# Patient Record
Sex: FEMALE | Race: BLACK OR AFRICAN AMERICAN | NOT HISPANIC OR LATINO | Employment: FULL TIME | URBAN - METROPOLITAN AREA
[De-identification: names, ages, dates, MRNs, and addresses within clinical notes are randomized per-mention and may not be internally consistent; named-entity substitution may affect disease eponyms.]

---

## 2021-05-27 ENCOUNTER — APPOINTMENT (EMERGENCY)
Dept: RADIOLOGY | Facility: HOSPITAL | Age: 46
End: 2021-05-27
Payer: COMMERCIAL

## 2021-05-27 ENCOUNTER — HOSPITAL ENCOUNTER (EMERGENCY)
Facility: HOSPITAL | Age: 46
Discharge: HOME | End: 2021-05-27
Attending: EMERGENCY MEDICINE | Admitting: EMERGENCY MEDICINE
Payer: COMMERCIAL

## 2021-05-27 ENCOUNTER — APPOINTMENT (EMERGENCY)
Dept: CT IMAGING | Facility: HOSPITAL | Age: 46
End: 2021-05-27
Payer: COMMERCIAL

## 2021-05-27 VITALS
HEIGHT: 62 IN | BODY MASS INDEX: 41.58 KG/M2 | DIASTOLIC BLOOD PRESSURE: 60 MMHG | HEART RATE: 66 BPM | SYSTOLIC BLOOD PRESSURE: 97 MMHG | TEMPERATURE: 98.7 F | WEIGHT: 225.97 LBS | RESPIRATION RATE: 21 BRPM | OXYGEN SATURATION: 100 %

## 2021-05-27 DIAGNOSIS — R07.89 CHEST WALL PAIN: Primary | ICD-10-CM

## 2021-05-27 LAB
ALBUMIN SERPL BCP-MCNC: 3.4 G/DL (ref 3.5–5)
ALP SERPL-CCNC: 62 U/L (ref 46–116)
ALT SERPL W P-5'-P-CCNC: 22 U/L (ref 12–78)
ANION GAP SERPL CALCULATED.3IONS-SCNC: 7 MMOL/L (ref 4–13)
APTT PPP: 34 SECONDS (ref 23–37)
AST SERPL W P-5'-P-CCNC: 12 U/L (ref 5–45)
BASOPHILS # BLD AUTO: 0.08 THOUSANDS/ΜL (ref 0–0.1)
BASOPHILS NFR BLD AUTO: 1 % (ref 0–1)
BILIRUB SERPL-MCNC: 0.27 MG/DL (ref 0.2–1)
BUN SERPL-MCNC: 11 MG/DL (ref 5–25)
CALCIUM ALBUM COR SERPL-MCNC: 9.5 MG/DL (ref 8.3–10.1)
CALCIUM SERPL-MCNC: 9 MG/DL (ref 8.3–10.1)
CHLORIDE SERPL-SCNC: 101 MMOL/L (ref 100–108)
CO2 SERPL-SCNC: 28 MMOL/L (ref 21–32)
CREAT SERPL-MCNC: 0.87 MG/DL (ref 0.6–1.3)
EOSINOPHIL # BLD AUTO: 0.32 THOUSAND/ΜL (ref 0–0.61)
EOSINOPHIL NFR BLD AUTO: 4 % (ref 0–6)
ERYTHROCYTE [DISTWIDTH] IN BLOOD BY AUTOMATED COUNT: 17.4 % (ref 11.6–15.1)
EXT PREG TEST URINE: NEGATIVE
EXT. CONTROL ED NAV: NORMAL
GFR SERPL CREATININE-BSD FRML MDRD: 94 ML/MIN/1.73SQ M
GLUCOSE SERPL-MCNC: 94 MG/DL (ref 65–140)
HCT VFR BLD AUTO: 38 % (ref 34.8–46.1)
HGB BLD-MCNC: 12 G/DL (ref 11.5–15.4)
IMM GRANULOCYTES # BLD AUTO: 0.02 THOUSAND/UL (ref 0–0.2)
IMM GRANULOCYTES NFR BLD AUTO: 0 % (ref 0–2)
INR PPP: 1.85 (ref 0.84–1.19)
LYMPHOCYTES # BLD AUTO: 2.92 THOUSANDS/ΜL (ref 0.6–4.47)
LYMPHOCYTES NFR BLD AUTO: 35 % (ref 14–44)
MCH RBC QN AUTO: 25.8 PG (ref 26.8–34.3)
MCHC RBC AUTO-ENTMCNC: 31.6 G/DL (ref 31.4–37.4)
MCV RBC AUTO: 82 FL (ref 82–98)
MONOCYTES # BLD AUTO: 0.85 THOUSAND/ΜL (ref 0.17–1.22)
MONOCYTES NFR BLD AUTO: 10 % (ref 4–12)
NEUTROPHILS # BLD AUTO: 4.22 THOUSANDS/ΜL (ref 1.85–7.62)
NEUTS SEG NFR BLD AUTO: 50 % (ref 43–75)
NRBC BLD AUTO-RTO: 0 /100 WBCS
PLATELET # BLD AUTO: 301 THOUSANDS/UL (ref 149–390)
PMV BLD AUTO: 10.7 FL (ref 8.9–12.7)
POTASSIUM SERPL-SCNC: 4.1 MMOL/L (ref 3.5–5.3)
PROT SERPL-MCNC: 7.7 G/DL (ref 6.4–8.2)
PROTHROMBIN TIME: 20.4 SECONDS (ref 11.6–14.5)
RBC # BLD AUTO: 4.65 MILLION/UL (ref 3.81–5.12)
SARS-COV-2 RNA RESP QL NAA+PROBE: NEGATIVE
SODIUM SERPL-SCNC: 136 MMOL/L (ref 136–145)
TROPONIN I SERPL-MCNC: <0.02 NG/ML
TROPONIN I SERPL-MCNC: <0.02 NG/ML
WBC # BLD AUTO: 8.41 THOUSAND/UL (ref 4.31–10.16)

## 2021-05-27 PROCEDURE — 99285 EMERGENCY DEPT VISIT HI MDM: CPT | Performed by: EMERGENCY MEDICINE

## 2021-05-27 PROCEDURE — 36415 COLL VENOUS BLD VENIPUNCTURE: CPT

## 2021-05-27 PROCEDURE — 81025 URINE PREGNANCY TEST: CPT

## 2021-05-27 PROCEDURE — 93005 ELECTROCARDIOGRAM TRACING: CPT

## 2021-05-27 PROCEDURE — 85025 COMPLETE CBC W/AUTO DIFF WBC: CPT

## 2021-05-27 PROCEDURE — 85610 PROTHROMBIN TIME: CPT | Performed by: EMERGENCY MEDICINE

## 2021-05-27 PROCEDURE — 99285 EMERGENCY DEPT VISIT HI MDM: CPT

## 2021-05-27 PROCEDURE — 85730 THROMBOPLASTIN TIME PARTIAL: CPT | Performed by: EMERGENCY MEDICINE

## 2021-05-27 PROCEDURE — 71275 CT ANGIOGRAPHY CHEST: CPT

## 2021-05-27 PROCEDURE — 71045 X-RAY EXAM CHEST 1 VIEW: CPT

## 2021-05-27 PROCEDURE — U0005 INFEC AGEN DETEC AMPLI PROBE: HCPCS | Performed by: EMERGENCY MEDICINE

## 2021-05-27 PROCEDURE — U0003 INFECTIOUS AGENT DETECTION BY NUCLEIC ACID (DNA OR RNA); SEVERE ACUTE RESPIRATORY SYNDROME CORONAVIRUS 2 (SARS-COV-2) (CORONAVIRUS DISEASE [COVID-19]), AMPLIFIED PROBE TECHNIQUE, MAKING USE OF HIGH THROUGHPUT TECHNOLOGIES AS DESCRIBED BY CMS-2020-01-R: HCPCS | Performed by: EMERGENCY MEDICINE

## 2021-05-27 PROCEDURE — 96374 THER/PROPH/DIAG INJ IV PUSH: CPT

## 2021-05-27 PROCEDURE — 84484 ASSAY OF TROPONIN QUANT: CPT | Performed by: EMERGENCY MEDICINE

## 2021-05-27 PROCEDURE — 84484 ASSAY OF TROPONIN QUANT: CPT

## 2021-05-27 PROCEDURE — 80053 COMPREHEN METABOLIC PANEL: CPT

## 2021-05-27 RX ORDER — KETOROLAC TROMETHAMINE 30 MG/ML
15 INJECTION, SOLUTION INTRAMUSCULAR; INTRAVENOUS ONCE
Status: COMPLETED | OUTPATIENT
Start: 2021-05-27 | End: 2021-05-27

## 2021-05-27 RX ORDER — NAPROXEN 500 MG/1
500 TABLET ORAL 2 TIMES DAILY WITH MEALS
Qty: 30 TABLET | Refills: 0 | Status: SHIPPED | OUTPATIENT
Start: 2021-05-27

## 2021-05-27 RX ADMIN — KETOROLAC TROMETHAMINE 15 MG: 30 INJECTION, SOLUTION INTRAMUSCULAR at 20:48

## 2021-05-27 RX ADMIN — IOHEXOL 85 ML: 350 INJECTION, SOLUTION INTRAVENOUS at 20:00

## 2021-05-28 LAB
ATRIAL RATE: 67 BPM
ATRIAL RATE: 72 BPM
P AXIS: 51 DEGREES
P AXIS: 78 DEGREES
PR INTERVAL: 138 MS
PR INTERVAL: 154 MS
QRS AXIS: 21 DEGREES
QRS AXIS: 28 DEGREES
QRSD INTERVAL: 84 MS
QRSD INTERVAL: 86 MS
QT INTERVAL: 398 MS
QT INTERVAL: 422 MS
QTC INTERVAL: 435 MS
QTC INTERVAL: 445 MS
T WAVE AXIS: 19 DEGREES
T WAVE AXIS: 30 DEGREES
VENTRICULAR RATE: 67 BPM
VENTRICULAR RATE: 72 BPM

## 2021-05-28 PROCEDURE — 93010 ELECTROCARDIOGRAM REPORT: CPT | Performed by: INTERNAL MEDICINE

## 2022-05-06 ENCOUNTER — APPOINTMENT (EMERGENCY)
Dept: CT IMAGING | Facility: HOSPITAL | Age: 47
End: 2022-05-06
Payer: COMMERCIAL

## 2022-05-06 ENCOUNTER — APPOINTMENT (EMERGENCY)
Dept: RADIOLOGY | Facility: HOSPITAL | Age: 47
End: 2022-05-06
Payer: COMMERCIAL

## 2022-05-06 ENCOUNTER — HOSPITAL ENCOUNTER (EMERGENCY)
Facility: HOSPITAL | Age: 47
Discharge: HOME | End: 2022-05-06
Attending: EMERGENCY MEDICINE | Admitting: EMERGENCY MEDICINE
Payer: COMMERCIAL

## 2022-05-06 VITALS
SYSTOLIC BLOOD PRESSURE: 114 MMHG | RESPIRATION RATE: 16 BRPM | HEART RATE: 72 BPM | OXYGEN SATURATION: 99 % | DIASTOLIC BLOOD PRESSURE: 56 MMHG | TEMPERATURE: 99.2 F

## 2022-05-06 DIAGNOSIS — M94.0 COSTOCHONDRITIS: Primary | ICD-10-CM

## 2022-05-06 LAB
ALBUMIN SERPL BCP-MCNC: 3.3 G/DL (ref 3.5–5)
ALP SERPL-CCNC: 56 U/L (ref 46–116)
ALT SERPL W P-5'-P-CCNC: 17 U/L (ref 12–78)
ANION GAP SERPL CALCULATED.3IONS-SCNC: 7 MMOL/L (ref 4–13)
AST SERPL W P-5'-P-CCNC: 10 U/L (ref 5–45)
BASOPHILS # BLD AUTO: 0.08 THOUSANDS/ΜL (ref 0–0.1)
BASOPHILS NFR BLD AUTO: 1 % (ref 0–1)
BILIRUB SERPL-MCNC: 0.14 MG/DL (ref 0.2–1)
BUN SERPL-MCNC: 11 MG/DL (ref 5–25)
CALCIUM ALBUM COR SERPL-MCNC: 9.5 MG/DL (ref 8.3–10.1)
CALCIUM SERPL-MCNC: 8.9 MG/DL (ref 8.3–10.1)
CARDIAC TROPONIN I PNL SERPL HS: <2 NG/L
CARDIAC TROPONIN I PNL SERPL HS: <2 NG/L
CHLORIDE SERPL-SCNC: 104 MMOL/L (ref 100–108)
CO2 SERPL-SCNC: 25 MMOL/L (ref 21–32)
CREAT SERPL-MCNC: 1 MG/DL (ref 0.6–1.3)
D DIMER PPP FEU-MCNC: 0.33 UG/ML FEU
EOSINOPHIL # BLD AUTO: 0.25 THOUSAND/ΜL (ref 0–0.61)
EOSINOPHIL NFR BLD AUTO: 3 % (ref 0–6)
ERYTHROCYTE [DISTWIDTH] IN BLOOD BY AUTOMATED COUNT: 16.1 % (ref 11.6–15.1)
EXT PREG TEST URINE: NEGATIVE
EXT. CONTROL ED NAV: NORMAL
GFR SERPL CREATININE-BSD FRML MDRD: 68 ML/MIN/1.73SQ M
GLUCOSE SERPL-MCNC: 96 MG/DL (ref 65–140)
HCT VFR BLD AUTO: 34.8 % (ref 34.8–46.1)
HGB BLD-MCNC: 11.4 G/DL (ref 11.5–15.4)
IMM GRANULOCYTES # BLD AUTO: 0.03 THOUSAND/UL (ref 0–0.2)
IMM GRANULOCYTES NFR BLD AUTO: 0 % (ref 0–2)
LYMPHOCYTES # BLD AUTO: 3.17 THOUSANDS/ΜL (ref 0.6–4.47)
LYMPHOCYTES NFR BLD AUTO: 34 % (ref 14–44)
MCH RBC QN AUTO: 26.4 PG (ref 26.8–34.3)
MCHC RBC AUTO-ENTMCNC: 32.8 G/DL (ref 31.4–37.4)
MCV RBC AUTO: 81 FL (ref 82–98)
MONOCYTES # BLD AUTO: 0.92 THOUSAND/ΜL (ref 0.17–1.22)
MONOCYTES NFR BLD AUTO: 10 % (ref 4–12)
NEUTROPHILS # BLD AUTO: 4.82 THOUSANDS/ΜL (ref 1.85–7.62)
NEUTS SEG NFR BLD AUTO: 52 % (ref 43–75)
NRBC BLD AUTO-RTO: 0 /100 WBCS
PLATELET # BLD AUTO: 303 THOUSANDS/UL (ref 149–390)
PMV BLD AUTO: 10.9 FL (ref 8.9–12.7)
POTASSIUM SERPL-SCNC: 4.5 MMOL/L (ref 3.5–5.3)
PROT SERPL-MCNC: 7.3 G/DL (ref 6.4–8.2)
RBC # BLD AUTO: 4.32 MILLION/UL (ref 3.81–5.12)
SODIUM SERPL-SCNC: 136 MMOL/L (ref 136–145)
WBC # BLD AUTO: 9.27 THOUSAND/UL (ref 4.31–10.16)

## 2022-05-06 PROCEDURE — 71275 CT ANGIOGRAPHY CHEST: CPT

## 2022-05-06 PROCEDURE — 84484 ASSAY OF TROPONIN QUANT: CPT | Performed by: EMERGENCY MEDICINE

## 2022-05-06 PROCEDURE — 99285 EMERGENCY DEPT VISIT HI MDM: CPT

## 2022-05-06 PROCEDURE — 85379 FIBRIN DEGRADATION QUANT: CPT | Performed by: PHYSICIAN ASSISTANT

## 2022-05-06 PROCEDURE — 71045 X-RAY EXAM CHEST 1 VIEW: CPT

## 2022-05-06 PROCEDURE — 99285 EMERGENCY DEPT VISIT HI MDM: CPT | Performed by: PHYSICIAN ASSISTANT

## 2022-05-06 PROCEDURE — 81025 URINE PREGNANCY TEST: CPT | Performed by: EMERGENCY MEDICINE

## 2022-05-06 PROCEDURE — 96374 THER/PROPH/DIAG INJ IV PUSH: CPT

## 2022-05-06 PROCEDURE — 36415 COLL VENOUS BLD VENIPUNCTURE: CPT

## 2022-05-06 PROCEDURE — 93005 ELECTROCARDIOGRAM TRACING: CPT

## 2022-05-06 PROCEDURE — 80053 COMPREHEN METABOLIC PANEL: CPT | Performed by: EMERGENCY MEDICINE

## 2022-05-06 PROCEDURE — G1004 CDSM NDSC: HCPCS

## 2022-05-06 PROCEDURE — 85025 COMPLETE CBC W/AUTO DIFF WBC: CPT | Performed by: EMERGENCY MEDICINE

## 2022-05-06 RX ORDER — METHOCARBAMOL 500 MG/1
500 TABLET, FILM COATED ORAL ONCE
Status: COMPLETED | OUTPATIENT
Start: 2022-05-06 | End: 2022-05-06

## 2022-05-06 RX ORDER — KETOROLAC TROMETHAMINE 30 MG/ML
15 INJECTION, SOLUTION INTRAMUSCULAR; INTRAVENOUS ONCE
Status: COMPLETED | OUTPATIENT
Start: 2022-05-06 | End: 2022-05-06

## 2022-05-06 RX ADMIN — METHOCARBAMOL 500 MG: 500 TABLET ORAL at 22:59

## 2022-05-06 RX ADMIN — KETOROLAC TROMETHAMINE 15 MG: 30 INJECTION, SOLUTION INTRAMUSCULAR at 22:59

## 2022-05-06 RX ADMIN — IOHEXOL 100 ML: 350 INJECTION, SOLUTION INTRAVENOUS at 22:01

## 2022-05-07 NOTE — ED PROVIDER NOTES
History  Chief Complaint   Patient presents with    Chest Pain     Patient reports intermittent sharp shooting chest pain x 2 weeks, worse with inhalation and when patient is looking to the right  Patient is a 81-year-old female with a past medical history significant for previous pulmonary embolisms presenting to the emergency department for evaluation of two weeks of right-sided 2 weeks of worsening right-sided chest pain  She states that the chest pain gets worse with inspiration and positional changes  Chest wall is tender to touch  She states that this does feel similar to her previous pulmonary embolism, however she is not having any shortness of breath  She states that it just hurts to take a deep breath  She is denying any fevers, chills, abdominal pain, nausea, vomiting, diarrhea  No other complaints at this time  Prior to Admission Medications   Prescriptions Last Dose Informant Patient Reported? Taking?   naproxen (NAPROSYN) 500 mg tablet   No No   Sig: Take 1 tablet (500 mg total) by mouth 2 (two) times a day with meals As needed for chest wall pain      Facility-Administered Medications: None       Past Medical History:   Diagnosis Date    Pulmonary embolism ([facility] [address] )        No past surgical history on file  No family history on file  I have reviewed and agree with the history as documented  E-Cigarette/Vaping     E-Cigarette/Vaping Substances     Social History     Tobacco Use    Smoking status: Not on file    Smokeless tobacco: Not on file   Substance Use Topics    Alcohol use: Not on file    Drug use: Not on file       Review of Systems   Constitutional: Negative for chills, diaphoresis and fever  HENT: Negative for congestion, facial swelling, nosebleeds, sore throat and voice change  Eyes: Negative for pain and redness  Respiratory: Negative for cough, choking, chest tightness, shortness of breath and stridor  Cardiovascular: Positive for chest pain   Negative for palpitations  Gastrointestinal: Negative for abdominal pain, diarrhea, nausea and vomiting  Musculoskeletal: Negative for arthralgias, back pain, myalgias, neck pain and neck stiffness  Skin: Negative for color change and rash  Neurological: Negative for dizziness, syncope, facial asymmetry, weakness, light-headedness, numbness and headaches  Psychiatric/Behavioral: Negative for confusion and suicidal ideas  All other systems reviewed and are negative  Physical Exam  Physical Exam  Vitals reviewed  Constitutional:       General: She is not in acute distress  Appearance: Normal appearance  She is obese  She is not ill-appearing, toxic-appearing or diaphoretic  HENT:      Head: Normocephalic and atraumatic  Right Ear: External ear normal       Left Ear: External ear normal       Nose: Nose normal  No congestion or rhinorrhea  Mouth/Throat:      Mouth: Mucous membranes are moist       Pharynx: Oropharynx is clear  No oropharyngeal exudate or posterior oropharyngeal erythema  Eyes:      General: No scleral icterus  Right eye: No discharge  Left eye: No discharge  Extraocular Movements: Extraocular movements intact  Conjunctiva/sclera: Conjunctivae normal       Pupils: Pupils are equal, round, and reactive to light  Cardiovascular:      Rate and Rhythm: Normal rate and regular rhythm  Pulses: Normal pulses  Heart sounds: Normal heart sounds  No murmur heard  No friction rub  No gallop  Pulmonary:      Effort: Pulmonary effort is normal  No respiratory distress  Breath sounds: Normal breath sounds  No stridor  No wheezing, rhonchi or rales  Chest:      Chest wall: Tenderness (Right costosternal junction) present  Abdominal:      General: Abdomen is flat  Palpations: Abdomen is soft  Tenderness: There is no abdominal tenderness  There is no guarding or rebound     Musculoskeletal:      Cervical back: Normal range of motion and neck supple  Right lower leg: No edema  Left lower leg: No edema  Skin:     General: Skin is warm and dry  Capillary Refill: Capillary refill takes less than 2 seconds  Neurological:      General: No focal deficit present  Mental Status: She is alert and oriented to person, place, and time  Psychiatric:         Mood and Affect: Mood normal          Behavior: Behavior normal          Vital Signs  ED Triage Vitals   Temperature Pulse Respirations Blood Pressure SpO2   05/06/22 2034 05/06/22 2034 05/06/22 2034 05/06/22 2036 05/06/22 2034   99 2 °F (37 3 °C) 77 18 102/53 100 %      Temp Source Heart Rate Source Patient Position - Orthostatic VS BP Location FiO2 (%)   05/06/22 2034 -- 05/06/22 2255 05/06/22 2255 --   Oral  Sitting Left arm       Pain Score       05/06/22 2255       5           Vitals:    05/06/22 2034 05/06/22 2036 05/06/22 2255   BP:  102/53 114/56   Pulse: 77  72   Patient Position - Orthostatic VS:   Sitting         Visual Acuity      ED Medications  Medications   iohexol (OMNIPAQUE) 350 MG/ML injection (SINGLE-DOSE) 100 mL (100 mL Intravenous Given 5/6/22 2201)   ketorolac (TORADOL) injection 15 mg (15 mg Intravenous Given 5/6/22 2259)   methocarbamol (ROBAXIN) tablet 500 mg (500 mg Oral Given 5/6/22 2259)       Diagnostic Studies  Results Reviewed     Procedure Component Value Units Date/Time    HS Troponin I 2hr [[id number]] Collected: 05/06/22 2304    Lab Status:  In process Specimen: Blood from Arm, Right Updated: 05/06/22 2306    POCT pregnancy, urine [[id number]]  (Normal) Resulted: 05/06/22 2258    Lab Status: Final result Updated: 05/06/22 2258     EXT PREG TEST UR (Ref: Negative) Negative     Control Valid    HS Troponin I 4hr [[id number]]     Lab Status: No result Specimen: Blood     D-dimer, quantitative [[id number]]  (Normal) Collected: 05/06/22 2150    Lab Status: Final result Specimen: Blood from Arm, Right Updated: 05/06/22 2223     D-Dimer, Quant 0 33 ug/ml FEU     HS Troponin 0hr (reflex protocol) [[id number]]  (Normal) Collected: 05/06/22 2047    Lab Status: Final result Specimen: Blood from Arm, Right Updated: 05/06/22 2122     hs TnI 0hr <2 ng/L     CBC and differential [[id number]]  (Abnormal) Collected: 05/06/22 2113    Lab Status: Final result Specimen: Blood from Arm, Right Updated: 05/06/22 2120     WBC 9 27 Thousand/uL      RBC 4 32 Million/uL      Hemoglobin 11 4 g/dL      Hematocrit 34 8 %      MCV 81 fL      MCH 26 4 pg      MCHC 32 8 g/dL      RDW 16 1 %      MPV 10 9 fL      Platelets 734 Thousands/uL      nRBC 0 /100 WBCs      Neutrophils Relative 52 %      Immat GRANS % 0 %      Lymphocytes Relative 34 %      Monocytes Relative 10 %      Eosinophils Relative 3 %      Basophils Relative 1 %      Neutrophils Absolute 4 82 Thousands/µL      Immature Grans Absolute 0 03 Thousand/uL      Lymphocytes Absolute 3 17 Thousands/µL      Monocytes Absolute 0 92 Thousand/µL      Eosinophils Absolute 0 25 Thousand/µL      Basophils Absolute 0 08 Thousands/µL     Comprehensive metabolic panel [[id number]]  (Abnormal) Collected: 05/06/22 2047    Lab Status: Final result Specimen: Blood from Arm, Right Updated: 05/06/22 2113     Sodium 136 mmol/L      Potassium 4 5 mmol/L      Chloride 104 mmol/L      CO2 25 mmol/L      ANION GAP 7 mmol/L      BUN 11 mg/dL      Creatinine 1 00 mg/dL      Glucose 96 mg/dL      Calcium 8 9 mg/dL      Corrected Calcium 9 5 mg/dL      AST 10 U/L      ALT 17 U/L      Alkaline Phosphatase 56 U/L      Total Protein 7 3 g/dL      Albumin 3 3 g/dL      Total Bilirubin 0 14 mg/dL      eGFR 68 ml/min/1 73sq m     Narrative:      [facility] guidelines for Chronic Kidney Disease (CKD):     Stage 1 with normal or high GFR (GFR > 90 mL/min/1 73 square meters)    Stage 2 Mild CKD (GFR = 60-89 mL/min/1 73 square meters)    Stage 3A Moderate CKD (GFR = 45-59 mL/min/1 73 square meters)    Stage 3B Moderate CKD (GFR = 30-44 mL/min/1 73 square meters)    Stage 4 Severe CKD (GFR = 15-29 mL/min/1 73 square meters)    Stage 5 End Stage CKD (GFR <15 mL/min/1 73 square meters)  Note: GFR calculation is accurate only with a steady state creatinine                 CTA ED chest PE Study   Final Result by Elmo Eden DO (05/06 2240)      No evidence of pulmonary artery embolus                  Workstation performed: [id number]         XR chest 1 view portable    (Results Pending)              Procedures  Procedures         ED Course  ED Course as of 05/06/22 2327   Fri May 06, 2022   2233 EKG reveals normal sinus rhythm with a rate of 73 beats per minute  No ST or T-wave changes  Unchanged from previous EKGs  SBIRT 22yo+      Most Recent Value   SBIRT (24 yo +)    In order to provide better care to our patients, we are screening all of our patients for alcohol and drug use  Would it be okay to ask you these screening questions? Yes Filed at: 05/06/2022 2258   Initial Alcohol Screen: US AUDIT-C     1  How often do you have a drink containing alcohol? 0 Filed at: 05/06/2022 2258   2  How many drinks containing alcohol do you have on a typical day you are drinking? 0 Filed at: 05/06/2022 2258   3a  Male UNDER 65: How often do you have five or more drinks on one occasion? 0 Filed at: 05/06/2022 2258   3b  FEMALE Any Age, or MALE 65+: How often do you have 4 or more drinks on one occassion? 0 Filed at: 05/06/2022 2258   Audit-C Score 0 Filed at: 05/06/2022 2258   REJI: How many times in the past year have you    Used an illegal drug or used a prescription medication for non-medical reasons? Never Filed at: 05/06/2022 2258                    MDM  Number of Diagnoses or Management Options  Costochondritis  Diagnosis management comments: Patient presenting for evaluation of chest pain  She appears comfortable  She is not any acute distress  Vital signs unremarkable    Lab work reassuring, EKG nonischemic, PE study normal   Pain reproducible to palpation, likely costochondritis  Patient felt better with Toradol Robaxin  She was discharged home with instructions follow-up with her primary care provider  Strict return precautions discussed  Patient is stable condition at time of discharge  Amount and/or Complexity of Data Reviewed  Clinical lab tests: ordered and reviewed  Tests in the radiology section of CPT®: ordered and reviewed    Patient Progress  Patient progress: stable      Disposition  Final diagnoses:   Costochondritis     Time reflects when diagnosis was documented in both MDM as applicable and the Disposition within this note     Time User Action Codes Description Comment    5/6/2022 11:26 PM Berny Davenport Add [M94 0] Costochondritis       ED Disposition     ED Disposition Condition Date/Time Comment    Discharge Stable Fri May 6, 2022 11:26 PM Jenni Martinez discharge to home/self care  Follow-up Information     Follow up With Specialties Details Why Contact Info Additional [address] [facility] Emergency Department Emergency Medicine Go to  If symptoms worsen [address] [facility] [id number] [facility] Emergency Department, [address] [place] [address], [address] Redmon Dennison, MD Family Medicine Schedule an appointment as soon as possible for a visit  for follow up [address] [place] [address]  [phone]             Patient's Medications   Discharge Prescriptions    No medications on file       No discharge procedures on file      PDMP Review     None          ED Provider  Electronically Signed by           Ankit Willett PA-C  05/06/22 0496

## 2022-05-08 LAB
ATRIAL RATE: 73 BPM
P AXIS: 61 DEGREES
PR INTERVAL: 136 MS
QRS AXIS: 29 DEGREES
QRSD INTERVAL: 84 MS
QT INTERVAL: 390 MS
QTC INTERVAL: 429 MS
T WAVE AXIS: 30 DEGREES
VENTRICULAR RATE: 73 BPM

## 2022-05-08 PROCEDURE — 93010 ELECTROCARDIOGRAM REPORT: CPT | Performed by: INTERNAL MEDICINE

## 2022-11-03 ENCOUNTER — TELEPHONE (OUTPATIENT)
Dept: PERINATAL CARE | Facility: OTHER | Age: 47
End: 2022-11-03

## 2022-11-03 NOTE — TELEPHONE ENCOUNTER
Spoke to patient on 10/31 and called and was unable to lvm x2 for patient to reschedule VV visit for 11/7   [facility] message sent

## 2022-11-07 ENCOUNTER — TELEMEDICINE (OUTPATIENT)
Dept: PERINATAL CARE | Facility: CLINIC | Age: 47
End: 2022-11-07

## 2022-11-07 VITALS — WEIGHT: 240 LBS | BODY MASS INDEX: 44.16 KG/M2 | HEIGHT: 62 IN

## 2022-11-07 DIAGNOSIS — J45.20 MILD INTERMITTENT ASTHMA, UNSPECIFIED WHETHER COMPLICATED: ICD-10-CM

## 2022-11-07 DIAGNOSIS — Z98.891 HISTORY OF 2 CESAREAN SECTIONS: ICD-10-CM

## 2022-11-07 DIAGNOSIS — N97.9 INFERTILITY, FEMALE: ICD-10-CM

## 2022-11-07 DIAGNOSIS — Z86.711 HISTORY OF PULMONARY EMBOLISM: Primary | ICD-10-CM

## 2022-11-07 DIAGNOSIS — Z83.2 FAMILY HISTORY OF SICKLE CELL TRAIT: ICD-10-CM

## 2022-11-07 DIAGNOSIS — Z87.59 HISTORY OF NEONATAL DEATH: ICD-10-CM

## 2022-11-07 DIAGNOSIS — Z31.69 ENCOUNTER FOR PRECONCEPTION CONSULTATION: ICD-10-CM

## 2022-11-07 RX ORDER — DIPHENOXYLATE HYDROCHLORIDE AND ATROPINE SULFATE 2.5; .025 MG/1; MG/1
1 TABLET ORAL DAILY
COMMUNITY

## 2022-11-07 RX ORDER — RIVAROXABAN 20 MG/1
20 TABLET, FILM COATED ORAL
COMMUNITY
Start: 2022-09-20

## 2022-11-07 RX ORDER — UREA 10 %
800 LOTION (ML) TOPICAL DAILY
Qty: 90 TABLET | Refills: 5 | Status: SHIPPED | OUTPATIENT
Start: 2022-11-07

## 2022-11-07 RX ORDER — ALBUTEROL SULFATE 90 UG/1
2 AEROSOL, METERED RESPIRATORY (INHALATION) EVERY 6 HOURS PRN
Qty: 25.5 G | Refills: 3 | Status: SHIPPED | OUTPATIENT
Start: 2022-11-07

## 2022-11-07 RX ORDER — ATORVASTATIN CALCIUM 20 MG/1
20 TABLET, FILM COATED ORAL DAILY
COMMUNITY
Start: 2022-11-06

## 2022-11-07 NOTE — LETTER
November 7, 2022     Nazia Chicas, [address] [address]  [address] [facility]    Patient: Cynthia Bennett   YOB: 1975   Date of Visit: 11/7/2022       Dear Dr Indiana Deluca: Thank you for referring Elissa Lemos to me for evaluation  Below are my notes for this consultation  If you have questions, please do not hesitate to call me  I look forward to following your patient along with you  Sincerely,        Shanta Egan MD        CC: No Recipients  Shanta Egan MD  11/7/2022  3:14 PM  Sign when Signing Visit  PRECONCEPTION CONSULTATION: MATERNAL-FETAL MEDICINE     Virtual Regular Visit    Verification of patient location: Cynthia Bennett is located in the following state in which I hold an active license [place]  The patient was identified by name and date of birth  She was informed that this is a telemedicine visit and that the visit is being conducted through the [employer]  She agrees to proceed     My office door was closed  No one else was in the room  She acknowledged consent and understanding of privacy and security of the video platform  The patient has agreed to participate and understands they can discontinue the visit at any time  Patient is aware this is a billable service  Assessment and Plan: Ms Brandon Patino is a 41y o  year-old [id number] here for preconception counseling  By issue:    Problem List Items Addressed This Visit        Respiratory    Mild intermittent asthma    Relevant Medications    albuterol (ProAir HFA) 90 mcg/act inhaler       Genitourinary    Infertility, female     · In the setting of pregnancy conceived via in-vitro fertilization, regardless of egg donor used or not, I recommend a screening fetal echocardiogram done around 22-24 weeks gestation owing to the slightly increased risk of fetal cardiac abnormalities in pregnancies conceived via in-vitro fertilization    Growth assessment is advised at 32 weeks  Antepartum fetal surveillance is advised weekly beginning at 36 weeks and continuing until delivery  · We discussed that  risks associated with assisted reproductive technology (ART) and ovulation induction which include multifetal gestations, prematurity, low birth weight, small for gestational age,  mortality,  delivery, placenta previa, abruptio placentae, preeclampsia, and birth defects  Although these risks are much higher in multifetal gestations, even singletons achieved with ART and ovulation induction may be at higher risk than singletons from naturally occurring pregnancies  Screening fetal echocardiogram is typically advised for women who conceived with the assistance of in vitro fertilization due to the increased risk of fetal structural cardiac abnormalities with IVF; this typically done at 22-24 weeks  This is done in addition to the standard ultrasounds offered by our office which include nuchal translucency ultrasound around 11-13 weeks followed by detailed anatomic survey with cervical length screening around 19-20 weeks  We additionally advise growth assessment at 28 and 34 weeks as well as weekly  surveillance beginning at 36 weeks and continuing until delivery  · We discussed that with ART and ovulation induction, higher-order multifetal pregnancy may occur  Multifetal pregnancy and its associated outcomes is the greatest risk of ART and ovulation induction and, consequently, every effort should be made to achieve a aguilar gestation  Risks of multifetal gestation increase dramatically with increasing number of fetuses and include: spontaneous  birth and complications of prematurity, low birth weight, cerebral palsy, and infant mortality  Maternal risks including hypertensive disorders, gestational diabetes, and  delivery risk with increasing number of fetuses    Therefore I strongly recommend all efforts be made to minimize the chance of multifetal gestation  These efforts include following professional society guidelines for number of embryos to be transferred during IVF, such as those from the [employer] (ASRM), and continuing to encourage and expand use of single-embryo transfer  We discussed that fetal reduction is an option, albeit a difficult one, should a multifetal gestation be achieved  · Maternal age >= 39 has been, in some literature, described as "extreme" advanced maternal age  In one study (PMID [id number]), within this age group, there were significantly higher rates of gestational diabetes (17%) and hypertensive complications (05 3%) and rates of  delivery at both less than 37 weeks (OR 2 1) and less than 34 weeks gestation (OR 3 5), when compared to women under 39  Rates of  delivery (78 5%), placenta previa (5 6%), postpartum hemorrhage (4%), and adverse  outcome more significantly higher in this age group as well  Other    History of pulmonary embolism - Primary     · Ordered APS testing though it would not change her management since she is on lifelong anticoagulation  · Ordered thrombophilia testing, for FVL and prothrombin only, given that she is on anticoagulation currently  · Given hematology referral given that she needs to transfer care  · Should switch to BID LMWH periconception  · Recommendations for pregnancy and postpartum are above, in bottom-most row  · Would advised LMWH throughout pregnancy along with timed delivery  Cessation of LMWH will depend on chosen delivery route  Relevant Orders    Prothrombin gene mutation    Factor 5 leiden    Anticardiolipin Ab, IgG/M, Qn    Beta-2 glycoprotein antibodies    Lupus anticoagulant    Ambulatory Referral to Hematology / Oncology    Encounter for preconception consultation     · She is on multivitamin already    · Sent folate Rx though we did not get to review  · Reviewed aneuploidy risks for 41year old as below:  ·   · Advised cessation of her atorvastatin in conjunction with PCP  · Aspirin prophylaxis indicated (strong RF: prior preeclampsia; moderate RFs: AMA, BMI>30, IVF, long IPI, race/racism - see recent AJOG article on race as aspirin risk factor)  Relevant Medications    folic acid (FOLVITE) 140 MCG tablet    Other Relevant Orders    Prothrombin gene mutation    Factor 5 leiden    Hemoglobin Electrophoresis    Anticardiolipin Ab, IgG/M, Qn    Beta-2 glycoprotein antibodies    Lupus anticoagulant    Family history of sickle cell trait     · Advised and ordered hemoglobin electrophoresis  Relevant Orders    Hemoglobin Electrophoresis    BMI 40 0-44 9, adult ([address] )     · Advised healthiest possible weight by healthiest means possible  · Encouraged 150 minutes/week aerobic exercise  · Advised limiting added sugar to 50g/day  · Pregnant women with a BMI>30 ideally should aim for maximum weight gain of 11-20 pounds ideally via healthy diet and regular exercise  Maternal BMI above 30 in pregnancy confers increased risks of preeclampsia, GDM, cardiac dysfunction, sleep apnea,  delivery, and VTE, and fetal risks include miscarriage, fetal anomalies (along with reduced detection), and stillbirth, macrosomia and impaired growth  Growth assessment is advised in the third trimester  For women with prepregnancy BMI >= 40, we advise weekly antepartum fetal surveillance beginning at 34 weeks  History of 2  sections     · We discussed her history of prior  section  Assessment of placental location is indicated at the time of anatomy scan to assess for risk of placenta accreta spectrum (PAS), as PAS is a risk of prior   Operative notes should be reviewed to assess for candidacy for TOLAC though given that her second  occurred at term, I suspect she did not have a prior classical hysterotoomy  History of  death     · Sounds to have been related to complications of prematurity though circumstances unclear  Advised obtaining records though given >20 years, these may be unobtainable  · Given preeclampsia in that  delivery, qualifies for aspirin but does so for other reasons  · Would advise starting antepartum fetal surveillance at 32 weeks given iatrogenic  birth due to preeclampsia  Referring physician:   Do Ari Rodriguez [facility]   [address]  [facility],  [address] [facility]    Reason for consultation:   Chief Complaint   Patient presents with   • Consult     Planning pregnancy, likely IVF, with Dr Na Callahan  Dear Dr Na Callahan,    Thank you for referring patient Sheila Hart for preconception Maternal-Fetal Medicine consultation regarding history of pulmonary embolism, AMA, elevated BMI  As you know, Ms Janet Wilson is a 41y o  year-old [id number]    Her history is as follows:    Past Medical History:   Diagnosis Date   • Asthma     history, no symptoms in years, inhaler ("starts with an A") is    • Pulmonary embolism ([address] )     2017, bilateral, put on Xarelto, took with NSAIDs and had adnexal hemorrhage complication; recurred in 2019 now is on lifelong Xarelto, managed by pulmonologist       Past Surgical History:   Procedure Laterality Date   • APPENDECTOMY     •  SECTION      x2 (, and in , with intervening TOLAC)   • COLONOSCOPY      benign polyp removed   • KNEE SURGERY Right    • SHOULDER SURGERY Right        OB History    Para Term  AB Living   8 3 2 1 5 2   SAB IAB Ectopic Multiple Live Births   3 2 0   3      # Outcome Date GA Lbr Jose/2nd Weight Sex Delivery Anes PTL Lv   8 SAB 2019              Birth Comments: D&C   7 Term    4026 g (8 lb 14 oz) M CS-Unspec   JUDITH      Birth Comments: keloid was excised; her son has SCT   6 Term 56    F    JUDITH   5   32w0d 1814 g (4 lb) F    ND      Birth Comments: 8 months gestation (uncertain # wks); preeclampsia, breech; lived 2 months 2 days,  day after G-tube and trach placed  Unclear on some details  4 IAB               Birth Comments: prior to marriage   3 IAB  16w0d             Birth Comments: T21; second trimester   2 SAB            1 SAB                Gynecologic history: Patient's last menstrual period was 2022 (exact date)  Social History     Tobacco Use   • Smoking status: Never Smoker   • Smokeless tobacco: Never Used   Substance Use Topics   • Alcohol use: Yes     Alcohol/week: 5 0 standard drinks     Types: 5 Glasses of wine per week   • Drug use: Never       Family History   Problem Relation Age of Onset   • Asthma Brother    • Asthma Son        Family history per our office screening tool includes 2 prior pregnancies affected by trisomy 24, son with sickle trait, father with hypertension, mother with hypertension in pregnancy; and her mother had a liver transplant due to alcoholism; but is negative for Ashkenazi [employer] ancestry, birth defects, blood clot in legs or lungs, diabetes, early-onset breast ovarian or colon cancer; other chromosome problem, genetic condition or carrier state for cystic fibrosis,  thalassemia, Santos-Sachs, or spinal muscular atrophy; hemophilia or von Willebrand's disease; opiate use or overdose        Current Outpatient Medications:   •  albuterol (ProAir HFA) 90 mcg/act inhaler, Inhale 2 puffs every 6 (six) hours as needed for wheezing, Disp: 25 5 g, Rfl: 3  •  folic acid (FOLVITE) 918 MCG tablet, Take 1 tablet (800 mcg total) by mouth daily, Disp: 90 tablet, Rfl: 5  •  atorvastatin (LIPITOR) 20 mg tablet, Take 20 mg by mouth daily, Disp: , Rfl:   •  multivitamin (THERAGRAN) TABS, Take 1 tablet by mouth daily, Disp: , Rfl:   •  Omega-3-acid Ethyl Esters & D3 1 & 1000 GM & UNIT KIT, Take 2 g by mouth daily, Disp: , Rfl:   •  Xarelto 20 MG tablet, Take 20 mg by mouth daily with dinner, Disp: , Rfl:     No Known Allergies    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)  Spouse/Partner Name: Prabhu Prince; Age 39; occupation: , Asset Protection at [employer]; he is in good health with exception of overweight and gout  HPI    Review of Systems   Constitutional: Negative for chills, fever and unexpected weight change  HENT: Negative for congestion, dental problem, facial swelling and sore throat  Eyes: Negative for visual disturbance  Respiratory: Negative for cough and shortness of breath  Cardiovascular: Negative for chest pain and palpitations  Gastrointestinal: Negative for diarrhea and vomiting  Endocrine: Negative for polydipsia  Genitourinary: Positive for vaginal bleeding  Negative for dysuria  Menstrual cycle started today   Musculoskeletal: Positive for back pain  Negative for joint swelling  Low back pain she attributes to weight-related   Skin: Negative for rash and wound  Allergic/Immunologic: Negative for immunocompromised state  Neurological: Negative for seizures and headaches  Hematological: Does not bruise/bleed easily  Psychiatric/Behavioral: Negative for hallucinations and suicidal ideas  Vitals: Height 5' 2" (1 575 m), weight 109 kg (240 lb), last menstrual period 11/07/2022, not currently breastfeeding  Physical Exam  Constitutional:       General: She is not in acute distress  Appearance: Normal appearance  She is not ill-appearing, toxic-appearing or diaphoretic  HENT:      Head: Normocephalic and atraumatic  Nose: No congestion or rhinorrhea  Eyes:      General: No scleral icterus  Right eye: No discharge  Left eye: No discharge  Extraocular Movements: Extraocular movements intact  Conjunctiva/sclera: Conjunctivae normal    Pulmonary:      Effort: Pulmonary effort is normal  No respiratory distress     Musculoskeletal: Cervical back: Normal range of motion  Skin:     Coloration: Skin is not jaundiced or pale  Findings: No erythema, lesion or rash  Neurological:      General: No focal deficit present  Mental Status: She is alert and oriented to person, place, and time  Psychiatric:         Mood and Affect: Mood normal          Behavior: Behavior normal        -------------------------    Approximately 71 minutes spent face-to-face (via virtual platform) with >50% spent in counseling and coordinating care  At the conclusion of today's encounter, all questions were answered to her satisfaction  We will see her back once pregnant or sooner if any questions arise  Thank you very much for this kind referral and please do not hesitate to contact me with any further questions or concerns  Sincerely,    Jaky Izaguirre  Attending Physician, Terrie      VIRTUAL VISIT DISCLAIMER      Juana Chow verbally agrees to participate in [employer]  Patient is aware that [employer] could be limited without vital signs or the ability to perform a full hands-on physical exam  Juana Chow understands she or the provider may request at any time to terminate the video visit and request the patient to seek care or treatment in person

## 2022-11-07 NOTE — PATIENT INSTRUCTIONS
My office will mail you your lab requisition forms and hematology consult  If you notice errors on your letter please let me know  It was a pleasure to meet you and stay in touch!

## 2022-11-07 NOTE — ASSESSMENT & PLAN NOTE
· Advised healthiest possible weight by healthiest means possible  · Encouraged 150 minutes/week aerobic exercise  · Advised limiting added sugar to 50g/day  · Pregnant women with a BMI>30 ideally should aim for maximum weight gain of 11-20 pounds ideally via healthy diet and regular exercise  Maternal BMI above 30 in pregnancy confers increased risks of preeclampsia, GDM, cardiac dysfunction, sleep apnea,  delivery, and VTE, and fetal risks include miscarriage, fetal anomalies (along with reduced detection), and stillbirth, macrosomia and impaired growth  Growth assessment is advised in the third trimester  For women with prepregnancy BMI >= 40, we advise weekly antepartum fetal surveillance beginning at 34 weeks

## 2022-11-07 NOTE — ASSESSMENT & PLAN NOTE
· We discussed her history of prior  section  Assessment of placental location is indicated at the time of anatomy scan to assess for risk of placenta accreta spectrum (PAS), as PAS is a risk of prior   Operative notes should be reviewed to assess for candidacy for TOLAC though given that her second  occurred at term, I suspect she did not have a prior classical hysterotoomy

## 2022-11-07 NOTE — ASSESSMENT & PLAN NOTE
· She is on multivitamin already  · Sent folate Rx though we did not get to review  · Reviewed aneuploidy risks for 41year old as below:  ·   · Advised cessation of her atorvastatin in conjunction with PCP  · Aspirin prophylaxis indicated (strong RF: prior preeclampsia; moderate RFs: AMA, BMI>30, IVF, long IPI, race/racism - see recent AJOG article on race as aspirin risk factor)

## 2022-11-07 NOTE — PROGRESS NOTES
PRECONCEPTION CONSULTATION: MATERNAL-FETAL MEDICINE     Virtual Regular Visit    Verification of patient location: [place] is located in the following state in which I hold an active license [place]  The patient was identified by name and date of birth  She was informed that this is a telemedicine visit and that the visit is being conducted through the [employer]  She agrees to proceed     My office door was closed  No one else was in the room  She acknowledged consent and understanding of privacy and security of the video platform  The patient has agreed to participate and understands they can discontinue the visit at any time  Patient is aware this is a billable service  Assessment and Plan: Ms Jefferson Pedro is a 41y o  year-old [id number] here for preconception counseling  By issue:    Problem List Items Addressed This Visit        Respiratory    Mild intermittent asthma    Relevant Medications    albuterol (ProAir HFA) 90 mcg/act inhaler       Genitourinary    Infertility, female     · In the setting of pregnancy conceived via in-vitro fertilization, regardless of egg donor used or not, I recommend a screening fetal echocardiogram done around 22-24 weeks gestation owing to the slightly increased risk of fetal cardiac abnormalities in pregnancies conceived via in-vitro fertilization  Growth assessment is advised at 32 weeks  Antepartum fetal surveillance is advised weekly beginning at 36 weeks and continuing until delivery  · We discussed that  risks associated with assisted reproductive technology (ART) and ovulation induction which include multifetal gestations, prematurity, low birth weight, small for gestational age,  mortality,  delivery, placenta previa, abruptio placentae, preeclampsia, and birth defects    Although these risks are much higher in multifetal gestations, even singletons achieved with ART and ovulation induction may be at higher risk than singletons from naturally occurring pregnancies  Screening fetal echocardiogram is typically advised for women who conceived with the assistance of in vitro fertilization due to the increased risk of fetal structural cardiac abnormalities with IVF; this typically done at 22-24 weeks  This is done in addition to the standard ultrasounds offered by our office which include nuchal translucency ultrasound around 11-13 weeks followed by detailed anatomic survey with cervical length screening around 19-20 weeks  We additionally advise growth assessment at 28 and 34 weeks as well as weekly  surveillance beginning at 36 weeks and continuing until delivery  · We discussed that with ART and ovulation induction, higher-order multifetal pregnancy may occur  Multifetal pregnancy and its associated outcomes is the greatest risk of ART and ovulation induction and, consequently, every effort should be made to achieve a aguilar gestation  Risks of multifetal gestation increase dramatically with increasing number of fetuses and include: spontaneous  birth and complications of prematurity, low birth weight, cerebral palsy, and infant mortality  Maternal risks including hypertensive disorders, gestational diabetes, and  delivery risk with increasing number of fetuses  Therefore I strongly recommend all efforts be made to minimize the chance of multifetal gestation  These efforts include following professional society guidelines for number of embryos to be transferred during IVF, such as those from the [employer] ([employer]), and continuing to encourage and expand use of single-embryo transfer  We discussed that fetal reduction is an option, albeit a difficult one, should a multifetal gestation be achieved  · Maternal age >= 39 has been, in some literature, described as "extreme" advanced maternal age   In one study (PMID [id number]), within this age group, there were significantly higher rates of gestational diabetes (17%) and hypertensive complications (61 1%) and rates of  delivery at both less than 37 weeks (OR 2 1) and less than 34 weeks gestation (OR 3 5), when compared to women under 39  Rates of  delivery (78 5%), placenta previa (5 6%), postpartum hemorrhage (4%), and adverse  outcome more significantly higher in this age group as well  Other    History of pulmonary embolism - Primary     · Ordered APS testing though it would not change her management since she is on lifelong anticoagulation  · Ordered thrombophilia testing, for FVL and prothrombin only, given that she is on anticoagulation currently  · Given hematology referral given that she needs to transfer care  · Should switch to BID LMWH periconception  · Recommendations for pregnancy and postpartum are above, in bottom-most row  · Would advised LMWH throughout pregnancy along with timed delivery  Cessation of LMWH will depend on chosen delivery route  Relevant Orders    Prothrombin gene mutation    Factor 5 leiden    Anticardiolipin Ab, IgG/M, Qn    Beta-2 glycoprotein antibodies    Lupus anticoagulant    Ambulatory Referral to Hematology / Oncology    Encounter for preconception consultation     · She is on multivitamin already  · Sent folate Rx though we did not get to review  · Reviewed aneuploidy risks for 41year old as below:  ·   · Advised cessation of her atorvastatin in conjunction with PCP  · Aspirin prophylaxis indicated (strong RF: prior preeclampsia; moderate RFs: AMA, BMI>30, IVF, long IPI, race/racism - see recent AJOG article on race as aspirin risk factor)           Relevant Medications    folic acid (FOLVITE) 010 MCG tablet    Other Relevant Orders    Prothrombin gene mutation    Factor 5 leiden    Hemoglobin Electrophoresis    Anticardiolipin Ab, IgG/M, Qn    Beta-2 glycoprotein antibodies    Lupus anticoagulant    Family history of sickle cell trait · Advised and ordered hemoglobin electrophoresis  Relevant Orders    Hemoglobin Electrophoresis    BMI 40 0-44 9, adult ([facility] [address] )     · Advised healthiest possible weight by healthiest means possible  · Encouraged 150 minutes/week aerobic exercise  · Advised limiting added sugar to 50g/day  · Pregnant women with a BMI>30 ideally should aim for maximum weight gain of 11-20 pounds ideally via healthy diet and regular exercise  Maternal BMI above 30 in pregnancy confers increased risks of preeclampsia, GDM, cardiac dysfunction, sleep apnea,  delivery, and VTE, and fetal risks include miscarriage, fetal anomalies (along with reduced detection), and stillbirth, macrosomia and impaired growth  Growth assessment is advised in the third trimester  For women with prepregnancy BMI >= 40, we advise weekly antepartum fetal surveillance beginning at 34 weeks  History of 2  sections     · We discussed her history of prior  section  Assessment of placental location is indicated at the time of anatomy scan to assess for risk of placenta accreta spectrum (PAS), as PAS is a risk of prior   Operative notes should be reviewed to assess for candidacy for TOLAC though given that her second  occurred at term, I suspect she did not have a prior classical hysterotoomy  History of  death     · Sounds to have been related to complications of prematurity though circumstances unclear  Advised obtaining records though given >20 years, these may be unobtainable  · Given preeclampsia in that  delivery, qualifies for aspirin but does so for other reasons  · Would advise starting antepartum fetal surveillance at 32 weeks given iatrogenic  birth due to preeclampsia               Referring physician:   Do Keo Beard [facility]   [address]  [facility],  [address] [place] [address]    Reason for consultation:   Chief Complaint   Patient presents with   • Consult     Planning pregnancy, likely IVF, with Dr Juan Townsend  Dear Dr Juan Townsend,    Thank you for referring patient David Bell for preconception Maternal-Fetal Medicine consultation regarding history of pulmonary embolism, AMA, elevated BMI  As you know, Ms Spike Blackman is a 41y o  year-old [id number]  Her history is as follows:    Past Medical History:   Diagnosis Date   • Asthma     history, no symptoms in years, inhaler ("starts with an A") is    • Pulmonary embolism ([address] )     2017, bilateral, put on Xarelto, took with NSAIDs and had adnexal hemorrhage complication; recurred in 2019 now is on lifelong Xarelto, managed by pulmonologist       Past Surgical History:   Procedure Laterality Date   • APPENDECTOMY     •  SECTION      x2 (, and in , with intervening TOLAC)   • COLONOSCOPY      benign polyp removed   • KNEE SURGERY Right    • SHOULDER SURGERY Right        OB History    Para Term  AB Living   8 3 2 1 5 2   SAB IAB Ectopic Multiple Live Births   3 2 0   3      # Outcome Date GA Lbr Jose/2nd Weight Sex Delivery Anes PTL Lv   8 SAB 2019              Birth Comments: D&C   7 Term    4026 g (8 lb 14 oz) M CS-Unspec   JUDITH      Birth Comments: keloid was excised; her son has SCT   6 Term 56    F    JUDITH   5   32w0d  1814 g (4 lb) F    ND      Birth Comments: 8 months gestation (uncertain # wks); preeclampsia, breech; lived 2 months 2 days,  day after G-tube and trach placed  Unclear on some details  4 IAB               Birth Comments: prior to marriage   3 IAB  16w0d             Birth Comments: T21; second trimester   2 SAB            1 SAB                Gynecologic history: Patient's last menstrual period was 2022 (exact date)  Social History     Tobacco Use   • Smoking status: Never Smoker   • Smokeless tobacco: Never Used   Substance Use Topics   • Alcohol use:  Yes     Alcohol/week: 5 0 standard drinks     Types: 5 Glasses of wine per week   • Drug use: Never       Family History   Problem Relation Age of Onset   • Asthma Brother    • Asthma Son        Family history per our office screening tool includes 2 prior pregnancies affected by trisomy 24, son with sickle trait, father with hypertension, mother with hypertension in pregnancy; and her mother had a liver transplant due to alcoholism; but is negative for Ashkenazi Jain ancestry, birth defects, blood clot in legs or lungs, diabetes, early-onset breast ovarian or colon cancer; other chromosome problem, genetic condition or carrier state for cystic fibrosis,  thalassemia, Santos-Sachs, or spinal muscular atrophy; hemophilia or von Willebrand's disease; opiate use or overdose  Current Outpatient Medications:   •  albuterol (ProAir HFA) 90 mcg/act inhaler, Inhale 2 puffs every 6 (six) hours as needed for wheezing, Disp: 25 5 g, Rfl: 3  •  folic acid (FOLVITE) 561 MCG tablet, Take 1 tablet (800 mcg total) by mouth daily, Disp: 90 tablet, Rfl: 5  •  atorvastatin (LIPITOR) 20 mg tablet, Take 20 mg by mouth daily, Disp: , Rfl:   •  multivitamin (THERAGRAN) TABS, Take 1 tablet by mouth daily, Disp: , Rfl:   •  Omega-3-acid Ethyl Esters & D3 1 & 1000 GM & UNIT KIT, Take 2 g by mouth daily, Disp: , Rfl:   •  Xarelto 20 MG tablet, Take 20 mg by mouth daily with dinner, Disp: , Rfl:     No Known Allergies    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)  Spouse/Partner Name: Chirag Lewis; Age 39; occupation: , Asset Protection at [employer]; he is in good health with exception of overweight and gout  HPI    Review of Systems   Constitutional: Negative for chills, fever and unexpected weight change  HENT: Negative for congestion, dental problem, facial swelling and sore throat  Eyes: Negative for visual disturbance  Respiratory: Negative for cough and shortness of breath  Cardiovascular: Negative for chest pain and palpitations  Gastrointestinal: Negative for diarrhea and vomiting  Endocrine: Negative for polydipsia  Genitourinary: Positive for vaginal bleeding  Negative for dysuria  Menstrual cycle started today   Musculoskeletal: Positive for back pain  Negative for joint swelling  Low back pain she attributes to weight-related   Skin: Negative for rash and wound  Allergic/Immunologic: Negative for immunocompromised state  Neurological: Negative for seizures and headaches  Hematological: Does not bruise/bleed easily  Psychiatric/Behavioral: Negative for hallucinations and suicidal ideas  Vitals: Height 5' 2" (1 575 m), weight 109 kg (240 lb), last menstrual period 11/07/2022, not currently breastfeeding  Physical Exam  Constitutional:       General: She is not in acute distress  Appearance: Normal appearance  She is not ill-appearing, toxic-appearing or diaphoretic  HENT:      Head: Normocephalic and atraumatic  Nose: No congestion or rhinorrhea  Eyes:      General: No scleral icterus  Right eye: No discharge  Left eye: No discharge  Extraocular Movements: Extraocular movements intact  Conjunctiva/sclera: Conjunctivae normal    Pulmonary:      Effort: Pulmonary effort is normal  No respiratory distress  Musculoskeletal:      Cervical back: Normal range of motion  Skin:     Coloration: Skin is not jaundiced or pale  Findings: No erythema, lesion or rash  Neurological:      General: No focal deficit present  Mental Status: She is alert and oriented to person, place, and time  Psychiatric:         Mood and Affect: Mood normal          Behavior: Behavior normal        -------------------------    Approximately 71 minutes spent face-to-face (via virtual platform) with >50% spent in counseling and coordinating care    At the conclusion of today's encounter, all questions were answered to her satisfaction  We will see her back once pregnant or sooner if any questions arise  Thank you very much for this kind referral and please do not hesitate to contact me with any further questions or concerns  Sincerely,    Ariel Mcarthur  Attending Physician, [place]      VIRTUAL VISIT DISCLAIMER      Usama Chester verbally agrees to participate in [facility]  Patient is aware that [facility] could be limited without vital signs or the ability to perform a full hands-on physical exam  Usama Chester understands she or the provider may request at any time to terminate the video visit and request the patient to seek care or treatment in person

## 2022-11-07 NOTE — ASSESSMENT & PLAN NOTE
· Ordered APS testing though it would not change her management since she is on lifelong anticoagulation  · Ordered thrombophilia testing, for FVL and prothrombin only, given that she is on anticoagulation currently  · Given hematology referral given that she needs to transfer care  · Should switch to BID LMWH periconception  · Recommendations for pregnancy and postpartum are above, in bottom-most row  · Would advised LMWH throughout pregnancy along with timed delivery  Cessation of LMWH will depend on chosen delivery route

## 2022-11-07 NOTE — ASSESSMENT & PLAN NOTE
· Sounds to have been related to complications of prematurity though circumstances unclear  Advised obtaining records though given >20 years, these may be unobtainable  · Given preeclampsia in that  delivery, qualifies for aspirin but does so for other reasons  · Would advise starting antepartum fetal surveillance at 32 weeks given iatrogenic  birth due to preeclampsia

## 2022-11-08 ENCOUNTER — DOCUMENTATION (OUTPATIENT)
Dept: PERINATAL CARE | Facility: CLINIC | Age: 47
End: 2022-11-08

## 2022-11-08 NOTE — PROGRESS NOTES
Called insurance to request auth for Prothrombin gene mutation(94573), Factor V UTGDEN(55962), Anticardiolipin Ab, ERK/J,GD(35362), Beta-2 glycoprotein VPJMHRHNIL(67916) and Lupus Anticoagulant (03488, [id number], 20574 and 08273)  Per Sheridan Hagan [id number] auth # [id number] valid 11/8/22 - 2/6/23 and for [id number] auth # [id number] valid 11/8/22 - 2/6/23 the other 3 tests don't require auth    Call reference # [id number]

## 2022-11-09 ENCOUNTER — TELEPHONE (OUTPATIENT)
Dept: HEMATOLOGY ONCOLOGY | Facility: CLINIC | Age: 47
End: 2022-11-09

## 2022-11-09 NOTE — TELEPHONE ENCOUNTER
Made attempt to schedule a new patient appointment for Hematology/Oncology, no answer, no voicemail to leave a message

## 2023-01-04 ENCOUNTER — APPOINTMENT (EMERGENCY)
Dept: CT IMAGING | Facility: HOSPITAL | Age: 48
End: 2023-01-04

## 2023-01-04 ENCOUNTER — HOSPITAL ENCOUNTER (EMERGENCY)
Facility: HOSPITAL | Age: 48
Discharge: HOME | End: 2023-01-04
Attending: EMERGENCY MEDICINE

## 2023-01-04 VITALS
HEIGHT: 62 IN | RESPIRATION RATE: 14 BRPM | HEART RATE: 74 BPM | OXYGEN SATURATION: 100 % | DIASTOLIC BLOOD PRESSURE: 72 MMHG | BODY MASS INDEX: 43.06 KG/M2 | SYSTOLIC BLOOD PRESSURE: 111 MMHG | TEMPERATURE: 98 F | WEIGHT: 234 LBS

## 2023-01-04 DIAGNOSIS — R10.30 LOWER ABDOMINAL PAIN: Primary | ICD-10-CM

## 2023-01-04 DIAGNOSIS — R11.2 NAUSEA AND VOMITING, UNSPECIFIED VOMITING TYPE: ICD-10-CM

## 2023-01-04 DIAGNOSIS — D25.9 UTERINE FIBROID: ICD-10-CM

## 2023-01-04 LAB
ALBUMIN SERPL BCP-MCNC: 3.6 G/DL (ref 3.5–5)
ALP SERPL-CCNC: 54 U/L (ref 46–116)
ALT SERPL W P-5'-P-CCNC: 23 U/L (ref 12–78)
ANION GAP SERPL CALCULATED.3IONS-SCNC: 11 MMOL/L (ref 4–13)
AST SERPL W P-5'-P-CCNC: 19 U/L (ref 5–45)
BACTERIA UR QL AUTO: ABNORMAL /HPF
BASOPHILS # BLD AUTO: 0.06 THOUSANDS/ÂΜL (ref 0–0.1)
BASOPHILS NFR BLD AUTO: 1 % (ref 0–1)
BILIRUB SERPL-MCNC: 0.16 MG/DL (ref 0.2–1)
BILIRUB UR QL STRIP: NEGATIVE
BUN SERPL-MCNC: 10 MG/DL (ref 5–25)
CALCIUM SERPL-MCNC: 9.2 MG/DL (ref 8.3–10.1)
CHLORIDE SERPL-SCNC: 104 MMOL/L (ref 96–108)
CLARITY UR: CLEAR
CO2 SERPL-SCNC: 24 MMOL/L (ref 21–32)
COLOR UR: ABNORMAL
CREAT SERPL-MCNC: 1.08 MG/DL (ref 0.6–1.3)
EOSINOPHIL # BLD AUTO: 0.07 THOUSAND/ÂΜL (ref 0–0.61)
EOSINOPHIL NFR BLD AUTO: 1 % (ref 0–6)
ERYTHROCYTE [DISTWIDTH] IN BLOOD BY AUTOMATED COUNT: 18.5 % (ref 11.6–15.1)
EXT PREGNANCY TEST URINE: NEGATIVE
EXT. CONTROL: NORMAL
GFR SERPL CREATININE-BSD FRML MDRD: 62 ML/MIN/1.73SQ M
GLUCOSE SERPL-MCNC: 139 MG/DL (ref 65–140)
GLUCOSE UR STRIP-MCNC: NEGATIVE MG/DL
HCT VFR BLD AUTO: 33.1 % (ref 34.8–46.1)
HGB BLD-MCNC: 10.7 G/DL (ref 11.5–15.4)
HGB UR QL STRIP.AUTO: ABNORMAL
IMM GRANULOCYTES # BLD AUTO: 0.04 THOUSAND/UL (ref 0–0.2)
IMM GRANULOCYTES NFR BLD AUTO: 0 % (ref 0–2)
KETONES UR STRIP-MCNC: NEGATIVE MG/DL
LEUKOCYTE ESTERASE UR QL STRIP: NEGATIVE
LIPASE SERPL-CCNC: 128 U/L (ref 73–393)
LYMPHOCYTES # BLD AUTO: 2.01 THOUSANDS/ÂΜL (ref 0.6–4.47)
LYMPHOCYTES NFR BLD AUTO: 18 % (ref 14–44)
MCH RBC QN AUTO: 24.3 PG (ref 26.8–34.3)
MCHC RBC AUTO-ENTMCNC: 32.3 G/DL (ref 31.4–37.4)
MCV RBC AUTO: 75 FL (ref 82–98)
MONOCYTES # BLD AUTO: 0.55 THOUSAND/ÂΜL (ref 0.17–1.22)
MONOCYTES NFR BLD AUTO: 5 % (ref 4–12)
MUCOUS THREADS UR QL AUTO: ABNORMAL
NEUTROPHILS # BLD AUTO: 8.54 THOUSANDS/ÂΜL (ref 1.85–7.62)
NEUTS SEG NFR BLD AUTO: 75 % (ref 43–75)
NITRITE UR QL STRIP: NEGATIVE
NON-SQ EPI CELLS URNS QL MICRO: ABNORMAL /HPF
NRBC BLD AUTO-RTO: 0 /100 WBCS
PH UR STRIP.AUTO: 6.5 [PH]
PLATELET # BLD AUTO: 313 THOUSANDS/UL (ref 149–390)
PMV BLD AUTO: 10.1 FL (ref 8.9–12.7)
POTASSIUM SERPL-SCNC: 4.5 MMOL/L (ref 3.5–5.3)
PROT SERPL-MCNC: 7.8 G/DL (ref 6.4–8.4)
PROT UR STRIP-MCNC: ABNORMAL MG/DL
RBC # BLD AUTO: 4.4 MILLION/UL (ref 3.81–5.12)
RBC #/AREA URNS AUTO: ABNORMAL /HPF
SODIUM SERPL-SCNC: 139 MMOL/L (ref 135–147)
SP GR UR STRIP.AUTO: 1.02 (ref 1–1.03)
UROBILINOGEN UR STRIP-ACNC: <2 MG/DL
WBC # BLD AUTO: 11.27 THOUSAND/UL (ref 4.31–10.16)
WBC #/AREA URNS AUTO: ABNORMAL /HPF

## 2023-01-04 RX ORDER — ONDANSETRON 4 MG/1
4 TABLET, ORALLY DISINTEGRATING ORAL EVERY 6 HOURS PRN
Qty: 20 TABLET | Refills: 0 | Status: SHIPPED | OUTPATIENT
Start: 2023-01-04

## 2023-01-04 RX ORDER — MORPHINE SULFATE 4 MG/ML
4 INJECTION, SOLUTION INTRAMUSCULAR; INTRAVENOUS ONCE
Status: COMPLETED | OUTPATIENT
Start: 2023-01-04 | End: 2023-01-04

## 2023-01-04 RX ORDER — ONDANSETRON 2 MG/ML
4 INJECTION INTRAMUSCULAR; INTRAVENOUS ONCE
Status: COMPLETED | OUTPATIENT
Start: 2023-01-04 | End: 2023-01-04

## 2023-01-04 RX ADMIN — ONDANSETRON 4 MG: 2 INJECTION INTRAMUSCULAR; INTRAVENOUS at 21:38

## 2023-01-04 RX ADMIN — SODIUM CHLORIDE 1000 ML: 0.9 INJECTION, SOLUTION INTRAVENOUS at 21:40

## 2023-01-04 RX ADMIN — MORPHINE SULFATE 4 MG: 4 INJECTION INTRAVENOUS at 21:38

## 2023-01-04 RX ADMIN — IOHEXOL 100 ML: 350 INJECTION, SOLUTION INTRAVENOUS at 21:23

## 2023-01-04 NOTE — Clinical Note
Lore Wagner was seen and treated in our emergency department on 1/4/2023  Diagnosis:     Aimee Cazares  may return to work on return date  She may return on this date: 01/06/2023         If you have any questions or concerns, please don't hesitate to call        Taran Pérez PA-C    ______________________________           _______________          _______________  Hospital Representative                              Date                                Time

## 2023-01-04 NOTE — Clinical Note
Tye Carmine was seen and treated in our emergency department on 1/4/2023  Diagnosis:     Esteban Bob  may return to work on return date  She may return on this date: 01/06/2023         If you have any questions or concerns, please don't hesitate to call        Phuong Cerda PA-C    ______________________________           _______________          _______________  Hospital Representative                              Date                                Time

## 2023-01-05 LAB
ATRIAL RATE: 90 BPM
P AXIS: 58 DEGREES
PR INTERVAL: 136 MS
QRS AXIS: 17 DEGREES
QRSD INTERVAL: 86 MS
QT INTERVAL: 398 MS
QTC INTERVAL: 486 MS
T WAVE AXIS: 10 DEGREES
VENTRICULAR RATE: 90 BPM

## 2023-01-05 NOTE — ED PROVIDER NOTES
History  Chief Complaint   Patient presents with   • Abdominal Pain     Since 1430, sudden onset, lower abdomen w n/v/d  Patient is a 21-year-old female with a past medical history significant for previous PE on Xarelto presenting to the emergency department for evaluation of bilateral lower abdominal pain that started around 230 this afternoon  She states that the pain comes and goes  10 out of 10 in severity  Pain does not radiate  No exacerbating or alleviating factors  Also nausea and vomiting  Previous abdominal surgeries include appendectomy and  section  Denying any fevers, chills, chest pain, difficulty breathing  No other complaints at this time  Prior to Admission Medications   Prescriptions Last Dose Informant Patient Reported? Taking?    Omega-3-acid Ethyl Esters & D3 1 & 1000 GM & UNIT KIT   Yes No   Sig: Take 2 g by mouth daily   Xarelto 20 MG tablet   Yes No   Sig: Take 20 mg by mouth daily with dinner   albuterol (ProAir HFA) 90 mcg/act inhaler   No No   Sig: Inhale 2 puffs every 6 (six) hours as needed for wheezing   atorvastatin (LIPITOR) 20 mg tablet   Yes No   Sig: Take 20 mg by mouth daily   folic acid (FOLVITE) 020 MCG tablet   No No   Sig: Take 1 tablet (800 mcg total) by mouth daily   multivitamin (THERAGRAN) TABS   Yes No   Sig: Take 1 tablet by mouth daily      Facility-Administered Medications: None       Past Medical History:   Diagnosis Date   • Asthma     history, no symptoms in years, inhaler ("starts with an A") is    • Pulmonary embolism ([facility] [address] )     2017, bilateral, put on Xarelto, took with NSAIDs and had adnexal hemorrhage complication; recurred in 2019 now is on lifelong Xarelto, managed by pulmonologist       Past Surgical History:   Procedure Laterality Date   • APPENDECTOMY     •  SECTION      x2 (, and in , with intervening TOLAC)   • COLONOSCOPY      benign polyp removed   • KNEE SURGERY Right    • SHOULDER SURGERY Right Family History   Problem Relation Age of Onset   • Asthma Brother    • Asthma Son      I have reviewed and agree with the history as documented  E-Cigarette/Vaping     E-Cigarette/Vaping Substances     Social History     Tobacco Use   • Smoking status: Never   • Smokeless tobacco: Never   Substance Use Topics   • Alcohol use: Yes     Alcohol/week: 5 0 standard drinks     Types: 5 Glasses of wine per week   • Drug use: Never       Review of Systems   Constitutional: Negative for chills and fever  HENT: Negative for congestion, facial swelling, nosebleeds, sore throat and voice change  Eyes: Negative for pain and redness  Respiratory: Negative for cough, choking, chest tightness, shortness of breath and stridor  Cardiovascular: Negative for chest pain and palpitations  Gastrointestinal: Positive for abdominal pain, nausea and vomiting  Negative for diarrhea  Musculoskeletal: Negative for arthralgias, back pain, myalgias, neck pain and neck stiffness  Skin: Negative for color change and rash  Neurological: Negative for dizziness, syncope, facial asymmetry, weakness, light-headedness, numbness and headaches  Psychiatric/Behavioral: Negative for confusion and suicidal ideas  All other systems reviewed and are negative  Physical Exam  Physical Exam  Vitals reviewed  Constitutional:       General: She is not in acute distress  Appearance: Normal appearance  She is not ill-appearing, toxic-appearing or diaphoretic  HENT:      Head: Normocephalic and atraumatic  Right Ear: External ear normal       Left Ear: External ear normal       Nose: Nose normal  No congestion or rhinorrhea  Mouth/Throat:      Mouth: Mucous membranes are moist       Pharynx: Oropharynx is clear  No oropharyngeal exudate or posterior oropharyngeal erythema  Eyes:      General: No scleral icterus  Right eye: No discharge  Left eye: No discharge        Extraocular Movements: Extraocular movements intact  Conjunctiva/sclera: Conjunctivae normal    Cardiovascular:      Rate and Rhythm: Normal rate and regular rhythm  Pulses: Normal pulses  Heart sounds: Normal heart sounds  No murmur heard  No friction rub  No gallop  Pulmonary:      Effort: Pulmonary effort is normal  No respiratory distress  Breath sounds: Normal breath sounds  No stridor  No wheezing, rhonchi or rales  Abdominal:      General: Abdomen is flat  Palpations: Abdomen is soft  Tenderness: There is abdominal tenderness (lower abdomen)  There is no guarding or rebound  Musculoskeletal:      Cervical back: Normal range of motion and neck supple  Right lower leg: No edema  Left lower leg: No edema  Skin:     General: Skin is warm and dry  Capillary Refill: Capillary refill takes less than 2 seconds  Neurological:      General: No focal deficit present  Mental Status: She is alert and oriented to person, place, and time     Psychiatric:         Mood and Affect: Mood normal          Behavior: Behavior normal          Vital Signs  ED Triage Vitals   Temperature Pulse Respirations Blood Pressure SpO2   01/04/23 1806 01/04/23 1806 01/04/23 1806 01/04/23 1806 01/04/23 1806   98 °F (36 7 °C) 96 18 100/59 100 %      Temp Source Heart Rate Source Patient Position - Orthostatic VS BP Location FiO2 (%)   01/04/23 1806 01/04/23 1806 01/04/23 1806 01/04/23 1806 --   Tympanic Monitor Sitting Left arm       Pain Score       01/04/23 2138       7           Vitals:    01/04/23 1806 01/04/23 2147 01/04/23 2242   BP: 100/59 108/65 111/72   Pulse: 96 89 74   Patient Position - Orthostatic VS: Sitting Sitting Sitting         Visual Acuity      ED Medications  Medications   sodium chloride 0 9 % bolus 1,000 mL (0 mL Intravenous Stopped 1/4/23 2243)   ondansetron (ZOFRAN) injection 4 mg (4 mg Intravenous Given 1/4/23 2138)   morphine injection 4 mg (4 mg Intravenous Given 1/4/23 2138)   iohexol (OMNIPAQUE) 350 MG/ML injection (SINGLE-DOSE) 100 mL (100 mL Intravenous Given 1/4/23 2123)       Diagnostic Studies  Results Reviewed     Procedure Component Value Units Date/Time    POCT pregnancy, urine [[id number]]  (Normal) Resulted: 01/04/23 2058    Lab Status: Final result Updated: 01/04/23 2058     EXT Preg Test, Ur Negative     Control Valid    Urine Microscopic [[id number]]  (Abnormal) Collected: 01/04/23 2019    Lab Status: Final result Specimen: Urine, Clean Catch Updated: 01/04/23 2028     RBC, UA Innumerable /hpf      WBC, UA 1-2 /hpf      Epithelial Cells Occasional /hpf      Bacteria, UA None Seen /hpf      MUCUS THREADS Occasional    UA w Reflex to Microscopic w Reflex to Culture [[id number]]  (Abnormal) Collected: 01/04/23 2019    Lab Status: Final result Specimen: Urine, Clean Catch Updated: 01/04/23 2027     Color, UA Light Yellow     Clarity, UA Clear     Specific Gravity, UA 1 024     pH, UA 6 5     Leukocytes, UA Negative     Nitrite, UA Negative     Protein, UA Trace mg/dl      Glucose, UA Negative mg/dl      Ketones, UA Negative mg/dl      Urobilinogen, UA <2 0 mg/dl      Bilirubin, UA Negative     Occult Blood, UA Large    Comprehensive metabolic panel [[id number]]  (Abnormal) Collected: 01/04/23 1813    Lab Status: Final result Specimen: Blood from Arm, Right Updated: 01/04/23 1835     Sodium 139 mmol/L      Potassium 4 5 mmol/L      Chloride 104 mmol/L      CO2 24 mmol/L      ANION GAP 11 mmol/L      BUN 10 mg/dL      Creatinine 1 08 mg/dL      Glucose 139 mg/dL      Calcium 9 2 mg/dL      AST 19 U/L      ALT 23 U/L      Alkaline Phosphatase 54 U/L      Total Protein 7 8 g/dL      Albumin 3 6 g/dL      Total Bilirubin 0 16 mg/dL      eGFR 62 ml/min/1 73sq m     Narrative:      [place] guidelines for Chronic Kidney Disease (CKD):   •  Stage 1 with normal or high GFR (GFR > 90 mL/min/1 73 square meters)  •  Stage 2 Mild CKD (GFR = 60-89 mL/min/1 73 square meters)  • Stage 3A Moderate CKD (GFR = 45-59 mL/min/1 73 square meters)  •  Stage 3B Moderate CKD (GFR = 30-44 mL/min/1 73 square meters)  •  Stage 4 Severe CKD (GFR = 15-29 mL/min/1 73 square meters)  •  Stage 5 End Stage CKD (GFR <15 mL/min/1 73 square meters)  Note: GFR calculation is accurate only with a steady state creatinine    Lipase [[id number]]  (Normal) Collected: 01/04/23 1813    Lab Status: Final result Specimen: Blood from Arm, Right Updated: 01/04/23 1835     Lipase 128 u/L     CBC and differential [[id number]]  (Abnormal) Collected: 01/04/23 1813    Lab Status: Final result Specimen: Blood from Arm, Right Updated: 01/04/23 1818     WBC 11 27 Thousand/uL      RBC 4 40 Million/uL      Hemoglobin 10 7 g/dL      Hematocrit 33 1 %      MCV 75 fL      MCH 24 3 pg      MCHC 32 3 g/dL      RDW 18 5 %      MPV 10 1 fL      Platelets 697 Thousands/uL      nRBC 0 /100 WBCs      Neutrophils Relative 75 %      Immat GRANS % 0 %      Lymphocytes Relative 18 %      Monocytes Relative 5 %      Eosinophils Relative 1 %      Basophils Relative 1 %      Neutrophils Absolute 8 54 Thousands/µL      Immature Grans Absolute 0 04 Thousand/uL      Lymphocytes Absolute 2 01 Thousands/µL      Monocytes Absolute 0 55 Thousand/µL      Eosinophils Absolute 0 07 Thousand/µL      Basophils Absolute 0 06 Thousands/µL                  CT abdomen pelvis with contrast   Final Result by Shiela Segundo MD (01/04 2205)      No acute inflammatory process in the abdomen or the pelvis  Workstation performed: [id number]                    Procedures  Procedures         ED Course                                             Medical Decision Making  Patient presenting for evaluation of lower abdominal pain  Upon arrival, she appears comfortable  She is not in any acute distress  Vital signs are unremarkable  She does have lower abdominal tenderness on exam without rebound or guarding  Pain and nausea controlled with morphine and Zofran  Labs unremarkable  Patient currently on her menstrual cycle  CT of the abdomen and pelvis did not reveal any acute pathology, however she does have likely uterine fibroid  Unclear etiology of patient's symptoms at this time, possible dysmenorrhea/uterine fibroid  She was discharged home with instructions to follow-up with OB/GYN  Strict return precautions were discussed  She is in stable condition at time of discharge    Lower abdominal pain: acute illness or injury  Nausea and vomiting, unspecified vomiting type: acute illness or injury  Uterine fibroid: acute illness or injury  Amount and/or Complexity of Data Reviewed  Labs: ordered  Radiology: ordered  Risk  Prescription drug management  Disposition  Final diagnoses:   Lower abdominal pain   Uterine fibroid   Nausea and vomiting, unspecified vomiting type     Time reflects when diagnosis was documented in both MDM as applicable and the Disposition within this note     Time User Action Codes Description Comment    1/4/2023 10:34 PM Eliecer Strickland Add [R10 30] Lower abdominal pain     1/4/2023 10:34 PM Eliecer Strickland Add [D25 9] Uterine fibroid     1/4/2023 10:34 PM Eliecer Strickland Add [R11 2] Nausea and vomiting, unspecified vomiting type       ED Disposition     ED Disposition   Discharge    Condition   Stable    Date/Time   Wed Jan 4, 2023 10:33 PM    Comment   Nanette Marte discharge to home/self care                 Follow-up Information     Follow up With Specialties Details Why Contact Info Additional [address] [facility] Emergency Department Emergency Medicine Go to  If symptoms worsen [address] [facility] [id number] [facility] Emergency Department, [address], [place], [address] [place], [place] Family Medicine Schedule an appointment as soon as possible for a visit  For follow up [address] [facility] [address] Obstetrics and Gynecology Schedule an appointment as soon as possible for a visit  For follow up [address] [phone] [address] [facility], C/JAS [place], [address] [place] [address]   [phone]          Discharge Medication List as of 1/4/2023 10:36 PM      START taking these medications    Details   ondansetron (ZOFRAN-ODT) 4 mg disintegrating tablet Take 1 tablet (4 mg total) by mouth every 6 (six) hours as needed for nausea or vomiting, Starting Wed 1/4/2023, Print         CONTINUE these medications which have NOT CHANGED    Details   albuterol (ProAir HFA) 90 mcg/act inhaler Inhale 2 puffs every 6 (six) hours as needed for wheezing, Starting Mon 11/7/2022, Normal      atorvastatin (LIPITOR) 20 mg tablet Take 20 mg by mouth daily, Starting Sun 11/6/2022, Historical Med      folic acid (FOLVITE) 905 MCG tablet Take 1 tablet (800 mcg total) by mouth daily, Starting Mon 11/7/2022, Normal      multivitamin (THERAGRAN) TABS Take 1 tablet by mouth daily, Historical Med      Omega-3-acid Ethyl Esters & D3 1 & 1000 GM & UNIT KIT Take 2 g by mouth daily, Historical Med      Xarelto 20 MG tablet Take 20 mg by mouth daily with dinner, Starting Tue 9/20/2022, Historical Med             No discharge procedures on file      PDMP Review     None          ED Provider  Electronically Signed by           Aracelis Lewis PA-C  01/06/23 2087

## 2023-01-10 NOTE — PROGRESS NOTES
HEMATOLOGY CLINIC NOTE    Primary Care Provider: Joel Lewis MD  Referring Provider:    MRN: [id number]  : 1975    Assessment / Plan:   1  History of pulmonary embolism  This is a 44-year-old female with a history of recurrent PE, frequent miscarriage who presents for discussion of the preceding  She had hypercoagulable work-up 2022 which showed negative testing specifically for antiphospholipid syndrome  She is taking Xarelto 20 mg once daily without any issues  We will test her for prothrombin gene mutation  Otherwise, she had full panel testing which was negative  Due to her recurrent PE history, occupation with working from home, she will continue lifelong AC with Xarelto  Could consider dropping to prophylactic dosage in the future  - Prothrombin gene mutation; Future    2  Breast cancer screening by mammogram  Due for mammogram  Will order for her to have done shortly  No breast complaints  - Mammo screening bilateral w cad; Future    3  Microcytic hypochromic anemia  4  Iron deficiency anemia due to chronic blood loss  5  Menorrhagia with regular cycle  Patient recently was diagnosed with fibroids  She has severe menorrhagia  She is seeing OB/GYN shortly  She has a referral and will make an appointment soon  Her most recent labs showed hemoglobin 10 7, MCV 75  No recent iron panel  I discussed IV iron treatments with her providing option of Venofor 300 mg x 4 versus Feraheme 510 mg x 2 versus Injectafer 750 mg x 2  She would like to proceed with Feraheme 510 mg x 2  If insurance does not want to cover this, we will consider Venofer instead  She will have iron panel prior to starting IV iron treatment  She will have CBC, iron panel in 3 months to see response  She understands providing IV iron as a temporary solution    She needs to see OB/GYN to help solve underlying etiology which is her fibroids       - Iron Panel (Includes Ferritin, Iron Sat%, Iron, and TIBC); Future  - CBC and differential; Future    Patient education provided regarding IV iron  Side effects include but are not limited to the following: hypotension, muscle aches, headache, chills, diarrhea, nausea, lightheadedness, rashes, and very rarely anaphylactic reaction  Patient understands & would still like to proceed with treatment  · Discussion of decision making    I personally reviewed the following lab results, the image studies, pathology, other specialty/physicians consult notes and recommendations, and outside medical records from [facility]  I had a lengthy discussion with the patient and shared the work-up findings  I spent 50 minutes reviewing the records (labs, clinician notes, outside records, medical history, ordering medicine/tests/procedures, interpreting the imaging/labs previously done) and coordination of care as well as direct time with the patient today, of which greater than 50% of the time was spent in counseling and coordination of care with the patient/family  · Plan/Labs  · Continue lifelong anticoagulation  Patient does not meet criteria for antiphospholipid syndrome  Continue Xarelto 20 mg once daily  May consider prophylactic dose in the future  Prothrombin gene mutation testing to be done shortly  · Provide IV iron with Feraheme 510 mg x 2  Obtain iron panel prior to treatment  Repeat CBC, iron panel in 3 months  · Order mammogram for breast cancer screening  Follow Up: 3 months virtually     All questions were answered to the patient's satisfaction during this encounter  The patient knows the contact information for our office and knows to reach out for any relevant concerns related to this encounter   They are to call for any temperature 100 4 or higher, new symptoms including but not restricted to shaking chills, decreased appetite, nausea, vomiting, diarrhea, increased fatigue, shortness of breath or chest pain, confusion, and not feeling the strength to come to the clinic  For all other listed problems and medical diagnosis in their chart - they are managed by PCP and/or other specialists, which the patient acknowledges  Thank you very much for your consultation and making us a part of this patient's care  We are continuing to follow closely with you  Please do not hesitate to reach out to me with any additional questions or concerns  Reason for visit:     No chief complaint on file  History of Hematology Illness:     Coni Gibson is a 41 y o  female who came in for consultation  1  Pulmonary Embolism, bilateral  2  History of multiple miscarriages   - 2017: Patient had bilateral PE with possible heart strain  Diagnosed in [place]  Was placed on Xarelto 20mg once daily for 7 months  Then taken off due to menstrual cycle increased  - 2020/2021: Patient had bilateral PE again  Diagnosed in [place]  Placed on Xarelto again   - Patient had history of 4 miscarriages  All in first trimester  Last pregnancy was in 2020/2021   - Possible provoking factor for 2017 is long travel in car sometimes for entire days on weekend with   Patient currently working from home sitting for long periods of time  Currently on lifelong AC due to recurrent PE + occupational history    - 11/2022: Patient was tested with factor V Leiden mutation, lupus anticoagulant, protein S total antigen, protein S functional testing, protein C functional testing, factor II, beta-2 glycoprotein antibodies, Antithrombin III activity, anticardiolipin antibody all of which were unrevealing  Only testing not done was prothrombin gene mutation  3  Microcytic Hypochromic Anemia likely secondary to Menorrhagia  - patient recently diagnosed with fibroids  - 1/4/23: WBC 11 27, Hgb 10 7, MCV 75, PLT 3131K, ANC 8 54, rest of diff acceptable  No iron panel  Interval History:   01/10/23:  This is a 41year old female with a PMH of asthma, infertility, PE, FHx sickle cell trait, overweight presenting for consultation  Patient believes she may have seen hematology in 2017  She is currently taking Xarelto 20 mg once daily without significant problem  She recently was diagnosed with a fibroid and is having very heavy menstrual cycle  Otherwise, no other bleeding  She is on lifelong anticoagulation  No current chest pain, shortness of breath  Menstrual cycle lasts about 5 to 6 days with the first 3 heaviest   She uses 3 pads at a time on heaviest days and needs to change this every 2 hours  She does have fatigue,  especially surrounding menorrhagia  She has pica for ice  Slight lightheadedness, dizziness  Does not have energy to perform some ADLs  Patient has no other VTE other than above  patient has no family history of VTE  She does have a son with sickle cell trait  She has no liver or kidney conditions  Prior to VTE, no trauma, severe immobilization, surgery, hospitalization, birth control use  She did have some travel prior to 2017 PE as above  She does not smoke  No CVA or MI history  Miscarriage history as above  No stillborn birth or this carriages after  first trimester  No autoimmune conditions she knows of  She drinks occasionally  Not daily  She works from home for a [employer]  She has never had cancer personally  No family history of cancer  She is due for mammogram as last one was 2022  Problem list:     Patient Active Problem List   Diagnosis   • History of pulmonary embolism   • Encounter for preconception consultation   • Family history of sickle cell trait   • Mild intermittent asthma   • BMI 40 0-44 9, adult ([facility] [address] )   • Infertility, female   • History of 2  sections   • History of  death       REVIEW OF SYMPTOMS:   Review of Systems   Constitutional: Positive for fatigue  Negative for activity change, appetite change and unexpected weight change  HENT: Negative for nosebleeds  Eyes: Negative for visual disturbance  Respiratory: Negative for cough and shortness of breath  Cardiovascular: Negative for chest pain, palpitations and leg swelling  Gastrointestinal: Negative for abdominal pain, anal bleeding, blood in stool, constipation, diarrhea, nausea and vomiting  PICA for ice    Endocrine: Negative for cold intolerance  Genitourinary: Positive for menstrual problem  Negative for hematuria and vaginal bleeding  Musculoskeletal: Negative for arthralgias  Skin: Negative for color change, pallor and rash  Neurological: Positive for dizziness and light-headedness  Negative for syncope and headaches  Hematological: Negative for adenopathy  Does not bruise/bleed easily  Psychiatric/Behavioral: Negative for sleep disturbance  PHYSICAL EXAMINATION:     Vital Signs:   LMP 01/03/2023 (Exact Date)   There is no height or weight on file to calculate BSA  Ht Readings from Last 8 Encounters:   01/04/23 5' 2" (1 575 m)   11/07/22 5' 2" (1 575 m)   05/27/21 5' 2" (1 575 m)       Wt Readings from Last 8 Encounters:   01/04/23 106 kg (234 lb)   11/07/22 109 kg (240 lb)   05/27/21 102 kg (225 lb 15 5 oz)          Physical Exam  Constitutional:       General: She is not in acute distress  Appearance: Normal appearance  She is not ill-appearing, toxic-appearing or diaphoretic  HENT:      Head: Normocephalic and atraumatic  Eyes:      General: No scleral icterus  Extraocular Movements: Extraocular movements intact  Conjunctiva/sclera: Conjunctivae normal       Pupils: Pupils are equal, round, and reactive to light  Cardiovascular:      Rate and Rhythm: Normal rate and regular rhythm  Pulmonary:      Effort: Pulmonary effort is normal  No respiratory distress  Abdominal:      Tenderness: There is no abdominal tenderness  Musculoskeletal:         General: No tenderness  Normal range of motion  Cervical back: Normal range of motion and neck supple  Right lower leg: No edema  Left lower leg: No edema  Skin:     General: Skin is warm and dry  Coloration: Skin is not jaundiced or pale  Findings: No bruising, erythema, lesion or rash  Neurological:      General: No focal deficit present  Mental Status: She is alert and oriented to person, place, and time  Mental status is at baseline  Motor: No weakness  Psychiatric:         Mood and Affect: Mood normal          Behavior: Behavior normal          Thought Content: Thought content normal          Judgment: Judgment normal            Reviewed historical information        PAST MEDICAL HISTORY:  Past Medical History:   Diagnosis Date   • Asthma     history, no symptoms in years, inhaler ("starts with an A") is    • Pulmonary embolism ([facility] [address] )     2017, bilateral, put on Xarelto, took with NSAIDs and had adnexal hemorrhage complication; recurred in 2019 now is on lifelong Xarelto, managed by pulmonologist       PAST SURGICAL HISTORY:  Past Surgical History:   Procedure Laterality Date   • APPENDECTOMY     •  SECTION      x2 (, and in , with intervening TOLAC)   • COLONOSCOPY      benign polyp removed   • KNEE SURGERY Right    • SHOULDER SURGERY Right          CURRENT MEDICATIONS:     Current Outpatient Medications:   •  albuterol (ProAir HFA) 90 mcg/act inhaler, Inhale 2 puffs every 6 (six) hours as needed for wheezing, Disp: 25 5 g, Rfl: 3  •  atorvastatin (LIPITOR) 20 mg tablet, Take 20 mg by mouth daily, Disp: , Rfl:   •  folic acid (FOLVITE) 228 MCG tablet, Take 1 tablet (800 mcg total) by mouth daily, Disp: 90 tablet, Rfl: 5  •  multivitamin (THERAGRAN) TABS, Take 1 tablet by mouth daily, Disp: , Rfl:   •  Omega-3-acid Ethyl Esters & D3 1 & 1000 GM & UNIT KIT, Take 2 g by mouth daily, Disp: , Rfl:   •  ondansetron (ZOFRAN-ODT) 4 mg disintegrating tablet, Take 1 tablet (4 mg total) by mouth every 6 (six) hours as needed for nausea or vomiting, Disp: 20 tablet, Rfl: 0  •  Xarelto 20 MG tablet, Take 20 mg by mouth daily with dinner, Disp: , Rfl:     SOCIAL HISTORY:  Social History     Tobacco Use   • Smoking status: Never   • Smokeless tobacco: Never   Substance Use Topics   • Alcohol use: Yes     Alcohol/week: 5 0 standard drinks     Types: 5 Glasses of wine per week   • Drug use: Never       FAMILY HISTORY:  Family History   Problem Relation Age of Onset   • Asthma Brother    • Asthma Son        ALLERGIES:  No Known Allergies      LAB:  Lab Results   Component Value Date    WBC 11 27 (H) 01/04/2023    HGB 10 7 (L) 01/04/2023    HCT 33 1 (L) 01/04/2023    MCV 75 (L) 01/04/2023     01/04/2023     Lab Results   Component Value Date    SODIUM 139 01/04/2023    K 4 5 01/04/2023     01/04/2023    CO2 24 01/04/2023    AGAP 11 01/04/2023    BUN 10 01/04/2023    CREATININE 1 08 01/04/2023    GLUC 139 01/04/2023    CALCIUM 9 2 01/04/2023    AST 19 01/04/2023    ALT 23 01/04/2023    ALKPHOS 54 01/04/2023    TP 7 8 01/04/2023    TBILI 0 16 (L) 01/04/2023    EGFR 62 01/04/2023       IMAGING:  CT abdomen pelvis with contrast  Narrative: CT ABDOMEN AND PELVIS WITH IV CONTRAST    INDICATION:   LLQ abdominal pain  lower abdominal pain  COMPARISON:  None  TECHNIQUE:  CT examination of the abdomen and pelvis was performed  Axial, sagittal, and coronal 2D reformatted images were created from the source data and submitted for interpretation  Radiation dose length product (DLP) for this visit:  836 mGy-cm   This examination, like all CT scans performed in the [facility], was performed utilizing techniques to minimize radiation dose exposure, including the use of iterative   reconstruction and automated exposure control  IV Contrast:  100 mL of iohexol (OMNIPAQUE)  Enteric Contrast:  Enteric contrast was not administered      FINDINGS:    ABDOMEN    LOWER CHEST:  No clinically significant abnormality identified in the visualized lower chest     LIVER/BILIARY TREE:  1 1 cm hypervascular focus in segment 4 of the liver most likely represents a hemangioma  Liver morphology is normal l    GALLBLADDER:  Probable small gallstones  No gallbladder wall thickening or pericholecystic fluid  SPLEEN:  Unremarkable  PANCREAS:  Unremarkable  ADRENAL GLANDS:  Unremarkable  KIDNEYS/URETERS:  Unremarkable  No hydronephrosis  STOMACH AND BOWEL:  Unremarkable  APPENDIX:  No findings to suggest appendicitis  ABDOMINOPELVIC CAVITY:  No ascites  No pneumoperitoneum  No lymphadenopathy  VESSELS:  Unremarkable for patient's age  PELVIS    REPRODUCTIVE ORGANS:  Ill-defined area of decreased enhancement in the myometrium left mid uterus likely represent either a fibroid or focal adenomyosis  URINARY BLADDER:  Unremarkable  ABDOMINAL WALL/INGUINAL REGIONS:  Diastases of the linea alba, and small fat-containing umbilical hernia  OSSEOUS STRUCTURES:  No acute fracture or destructive osseous lesion  Impression: No acute inflammatory process in the abdomen or the pelvis      Workstation performed: [id number]

## 2023-01-11 ENCOUNTER — CONSULT (OUTPATIENT)
Dept: HEMATOLOGY ONCOLOGY | Facility: CLINIC | Age: 48
End: 2023-01-11

## 2023-01-11 ENCOUNTER — APPOINTMENT (OUTPATIENT)
Dept: LAB | Facility: HOSPITAL | Age: 48
End: 2023-01-11

## 2023-01-11 ENCOUNTER — TELEPHONE (OUTPATIENT)
Dept: HEMATOLOGY ONCOLOGY | Facility: CLINIC | Age: 48
End: 2023-01-11

## 2023-01-11 VITALS
TEMPERATURE: 97.2 F | HEART RATE: 83 BPM | RESPIRATION RATE: 16 BRPM | BODY MASS INDEX: 44.72 KG/M2 | SYSTOLIC BLOOD PRESSURE: 108 MMHG | HEIGHT: 62 IN | WEIGHT: 243 LBS | OXYGEN SATURATION: 96 % | DIASTOLIC BLOOD PRESSURE: 74 MMHG

## 2023-01-11 DIAGNOSIS — Z86.711 HISTORY OF PULMONARY EMBOLISM: Primary | ICD-10-CM

## 2023-01-11 DIAGNOSIS — Z31.41 FERTILITY TESTING: ICD-10-CM

## 2023-01-11 DIAGNOSIS — D50.9 MICROCYTIC HYPOCHROMIC ANEMIA: ICD-10-CM

## 2023-01-11 DIAGNOSIS — N92.0 MENORRHAGIA WITH REGULAR CYCLE: ICD-10-CM

## 2023-01-11 DIAGNOSIS — Z12.31 BREAST CANCER SCREENING BY MAMMOGRAM: ICD-10-CM

## 2023-01-11 DIAGNOSIS — E28.2 POLYCYSTIC OVARIES: ICD-10-CM

## 2023-01-11 DIAGNOSIS — D50.0 IRON DEFICIENCY ANEMIA DUE TO CHRONIC BLOOD LOSS: ICD-10-CM

## 2023-01-11 DIAGNOSIS — Z86.711 HISTORY OF PULMONARY EMBOLISM: ICD-10-CM

## 2023-01-11 LAB
FERRITIN SERPL-MCNC: 9 NG/ML (ref 8–388)
IRON SATN MFR SERPL: 8 % (ref 15–50)
IRON SERPL-MCNC: 27 UG/DL (ref 50–170)
PROLACTIN SERPL-MCNC: 7.9 NG/ML
TIBC SERPL-MCNC: 349 UG/DL (ref 250–450)

## 2023-01-11 RX ORDER — SODIUM CHLORIDE 9 MG/ML
20 INJECTION, SOLUTION INTRAVENOUS ONCE
Status: CANCELLED | OUTPATIENT
Start: 2023-01-16

## 2023-01-11 NOTE — TELEPHONE ENCOUNTER
While we try to accommodate patient requests, our priority is to schedule treatment according to Doctor's orders and site availability  Does the Provider use the intake sheet or checkout note? What would be a preferred day of the week that would work best for your infusion appointment? ANY BUT WED  Do you prefer mornings or afternoons for your appointments? LATEST  POSSIBLE APPT  Are there any days or dates that do not work for your schedule, including any upcoming vacations? NO  We are going to try our best to schedule you at the infusion center closest to your home  In the event that we are unable to what would be your next preferred infusion site or sites? 1  [facility]  2   3     Do you have transportation to take you to all of your appointments?  YES  Would you like the infusion center to draw labs from your port? (disregard if patient doesn't have a port or need labs for infusion appointment) NO

## 2023-01-11 NOTE — TELEPHONE ENCOUNTER
Spoke with patient  She is aware of all appts  She will call central scheduling to schedule her mammo

## 2023-01-12 ENCOUNTER — TELEPHONE (OUTPATIENT)
Dept: HEMATOLOGY ONCOLOGY | Facility: CLINIC | Age: 48
End: 2023-01-12

## 2023-01-12 DIAGNOSIS — D50.0 IRON DEFICIENCY ANEMIA DUE TO CHRONIC BLOOD LOSS: Primary | ICD-10-CM

## 2023-01-12 LAB
EST. AVERAGE GLUCOSE BLD GHB EST-MCNC: 111 MG/DL
HBA1C MFR BLD: 5.5 %

## 2023-01-12 RX ORDER — SODIUM CHLORIDE 9 MG/ML
20 INJECTION, SOLUTION INTRAVENOUS ONCE
Status: CANCELLED | OUTPATIENT
Start: 2023-01-17

## 2023-01-12 NOTE — PROGRESS NOTES
Title: Medication change due to insurance    Date patient scheduled: 1/17/23    Original medication ordered: Feraheme 510 mg x 2    New Medication ordered: venofer 300mg x 4    Office RN notified patient? ? Yes, patient aware and agreeable  Is the patient scheduled within 24 hours? ? If yes, follow up with verbal telephone call  Office RN to route to [facility]  Infusion  pool routes to [facility]  Infusion tech to receive message, confirm scheduled treatment duration matches ordered treatment duration or adjust accordingly, and re-link appointment request orders  Infusion tech to notify pharmacy and finance

## 2023-01-12 NOTE — TELEPHONE ENCOUNTER
Please see medication change and adjust schedule please  Patient prefers Tuesdays the latest appointment available  Title: Medication change due to insurance    Date patient scheduled: 1/17/23    Original medication ordered: Feraheme 510 mg x 2    New Medication ordered: venofer 300mg x 4    Office RN notified patient? ? Yes, patient aware and agreeable  Is the patient scheduled within 24 hours? ? If yes, follow up with verbal telephone call  Office RN to route to [facility]  Infusion  pool routes to [facility]  Infusion tech to receive message, confirm scheduled treatment duration matches ordered treatment duration or adjust accordingly, and re-link appointment request orders  Infusion tech to notify pharmacy and finance  Include Pregnancy/Lactation Warning?: No

## 2023-01-16 LAB — F2 GENE MUT ANL BLD/T: NORMAL

## 2023-01-17 ENCOUNTER — HOSPITAL ENCOUNTER (OUTPATIENT)
Dept: INFUSION CENTER | Facility: CLINIC | Age: 48
Discharge: HOME | End: 2023-01-17

## 2023-01-17 DIAGNOSIS — D50.0 IRON DEFICIENCY ANEMIA DUE TO CHRONIC BLOOD LOSS: Primary | ICD-10-CM

## 2023-01-17 RX ORDER — SODIUM CHLORIDE 9 MG/ML
20 INJECTION, SOLUTION INTRAVENOUS ONCE
Status: CANCELLED | OUTPATIENT
Start: 2023-01-24

## 2023-01-17 RX ORDER — SODIUM CHLORIDE 9 MG/ML
20 INJECTION, SOLUTION INTRAVENOUS ONCE
Status: COMPLETED | OUTPATIENT
Start: 2023-01-17 | End: 2023-01-17

## 2023-01-17 RX ADMIN — IRON SUCROSE 300 MG: 20 INJECTION, SOLUTION INTRAVENOUS at 16:05

## 2023-01-17 RX ADMIN — SODIUM CHLORIDE 20 ML/HR: 0.9 INJECTION, SOLUTION INTRAVENOUS at 16:04

## 2023-01-24 ENCOUNTER — HOSPITAL ENCOUNTER (OUTPATIENT)
Dept: INFUSION CENTER | Facility: CLINIC | Age: 48
Discharge: HOME | End: 2023-01-24

## 2023-01-24 VITALS
DIASTOLIC BLOOD PRESSURE: 54 MMHG | HEART RATE: 91 BPM | SYSTOLIC BLOOD PRESSURE: 96 MMHG | TEMPERATURE: 97.9 F | RESPIRATION RATE: 16 BRPM

## 2023-01-24 DIAGNOSIS — D50.0 IRON DEFICIENCY ANEMIA DUE TO CHRONIC BLOOD LOSS: Primary | ICD-10-CM

## 2023-01-24 RX ORDER — SODIUM CHLORIDE 9 MG/ML
20 INJECTION, SOLUTION INTRAVENOUS ONCE
Status: CANCELLED | OUTPATIENT
Start: 2023-01-30

## 2023-01-24 RX ORDER — SODIUM CHLORIDE 9 MG/ML
20 INJECTION, SOLUTION INTRAVENOUS ONCE
Status: COMPLETED | OUTPATIENT
Start: 2023-01-24 | End: 2023-01-24

## 2023-01-24 RX ADMIN — IRON SUCROSE 300 MG: 20 INJECTION, SOLUTION INTRAVENOUS at 16:12

## 2023-01-24 RX ADMIN — SODIUM CHLORIDE 20 ML/HR: 9 INJECTION, SOLUTION INTRAVENOUS at 16:13

## 2023-01-26 NOTE — PROGRESS NOTES
Pt called to see if she can change her Tuesday appt to either Monday or Wednesday   Pt rescheduled to Monday 1/31 at 1:30PM

## 2023-01-30 ENCOUNTER — HOSPITAL ENCOUNTER (OUTPATIENT)
Dept: INFUSION CENTER | Facility: CLINIC | Age: 48
Discharge: HOME | End: 2023-01-30

## 2023-01-30 VITALS
TEMPERATURE: 98.6 F | HEART RATE: 82 BPM | DIASTOLIC BLOOD PRESSURE: 66 MMHG | RESPIRATION RATE: 18 BRPM | SYSTOLIC BLOOD PRESSURE: 102 MMHG

## 2023-01-30 DIAGNOSIS — D50.0 IRON DEFICIENCY ANEMIA DUE TO CHRONIC BLOOD LOSS: Primary | ICD-10-CM

## 2023-01-30 RX ORDER — SODIUM CHLORIDE 9 MG/ML
20 INJECTION, SOLUTION INTRAVENOUS ONCE
Status: CANCELLED | OUTPATIENT
Start: 2023-02-07

## 2023-01-30 RX ORDER — SODIUM CHLORIDE 9 MG/ML
20 INJECTION, SOLUTION INTRAVENOUS ONCE
Status: COMPLETED | OUTPATIENT
Start: 2023-01-30 | End: 2023-01-30

## 2023-01-30 RX ADMIN — IRON SUCROSE 300 MG: 20 INJECTION, SOLUTION INTRAVENOUS at 14:12

## 2023-01-30 RX ADMIN — SODIUM CHLORIDE 20 ML/HR: 0.9 INJECTION, SOLUTION INTRAVENOUS at 14:12

## 2023-01-30 NOTE — PROGRESS NOTES
Pt to clinic for venofer  Pt offers no complaints today  Tolerated infusion without complications  Pt aware of next appointment  PIV removed  AVS was offered, pt declined  Pt ambulated out of clinic safely

## 2023-01-31 ENCOUNTER — HOSPITAL ENCOUNTER (OUTPATIENT)
Dept: INFUSION CENTER | Facility: CLINIC | Age: 48
Discharge: HOME | End: 2023-01-31

## 2023-02-07 ENCOUNTER — HOSPITAL ENCOUNTER (OUTPATIENT)
Dept: INFUSION CENTER | Facility: CLINIC | Age: 48
Discharge: HOME | End: 2023-02-07

## 2023-02-07 VITALS
RESPIRATION RATE: 18 BRPM | DIASTOLIC BLOOD PRESSURE: 54 MMHG | TEMPERATURE: 97 F | SYSTOLIC BLOOD PRESSURE: 117 MMHG | HEART RATE: 78 BPM

## 2023-02-07 DIAGNOSIS — D50.0 IRON DEFICIENCY ANEMIA DUE TO CHRONIC BLOOD LOSS: Primary | ICD-10-CM

## 2023-02-07 RX ORDER — SODIUM CHLORIDE 9 MG/ML
20 INJECTION, SOLUTION INTRAVENOUS ONCE
Status: CANCELLED | OUTPATIENT
Start: 2023-02-13

## 2023-02-07 RX ORDER — SODIUM CHLORIDE 9 MG/ML
20 INJECTION, SOLUTION INTRAVENOUS ONCE
Status: COMPLETED | OUTPATIENT
Start: 2023-02-07 | End: 2023-02-07

## 2023-02-07 RX ADMIN — SODIUM CHLORIDE 20 ML/HR: 0.9 INJECTION, SOLUTION INTRAVENOUS at 15:42

## 2023-02-07 RX ADMIN — IRON SUCROSE 300 MG: 20 INJECTION, SOLUTION INTRAVENOUS at 15:42

## 2023-02-07 NOTE — PROGRESS NOTES
Pt presents for venofer infusion offering no complaints  Pt tolerated treatment without incident  AVS declined, pt discharged without complication

## 2023-06-08 ENCOUNTER — TELEPHONE (OUTPATIENT)
Dept: HEMATOLOGY ONCOLOGY | Facility: CLINIC | Age: 48
End: 2023-06-08

## 2023-06-08 NOTE — TELEPHONE ENCOUNTER
Called patient to reschedule appt No Patient Care Coordination Note on file     will call and reschedule after she has labs done

## 2023-08-25 ENCOUNTER — APPOINTMENT (OUTPATIENT)
Dept: LAB | Facility: HOSPITAL | Age: 48
End: 2023-08-25
Payer: COMMERCIAL

## 2023-08-25 DIAGNOSIS — D50.9 MICROCYTIC HYPOCHROMIC ANEMIA: ICD-10-CM

## 2023-08-25 DIAGNOSIS — D50.0 IRON DEFICIENCY ANEMIA DUE TO CHRONIC BLOOD LOSS: ICD-10-CM

## 2023-08-25 LAB
BASOPHILS # BLD AUTO: 0.06 THOUSANDS/ÂΜL (ref 0–0.1)
BASOPHILS NFR BLD AUTO: 1 % (ref 0–1)
EOSINOPHIL # BLD AUTO: 0.23 THOUSAND/ÂΜL (ref 0–0.61)
EOSINOPHIL NFR BLD AUTO: 3 % (ref 0–6)
ERYTHROCYTE [DISTWIDTH] IN BLOOD BY AUTOMATED COUNT: 15.3 % (ref 11.6–15.1)
FERRITIN SERPL-MCNC: 11 NG/ML (ref 11–307)
HCT VFR BLD AUTO: 36.3 % (ref 34.8–46.1)
HGB BLD-MCNC: 11.8 G/DL (ref 11.5–15.4)
IMM GRANULOCYTES # BLD AUTO: 0.02 THOUSAND/UL (ref 0–0.2)
IMM GRANULOCYTES NFR BLD AUTO: 0 % (ref 0–2)
IRON SATN MFR SERPL: 21 % (ref 15–50)
IRON SERPL-MCNC: 72 UG/DL (ref 50–212)
LYMPHOCYTES # BLD AUTO: 2.19 THOUSANDS/ÂΜL (ref 0.6–4.47)
LYMPHOCYTES NFR BLD AUTO: 24 % (ref 14–44)
MCH RBC QN AUTO: 26.2 PG (ref 26.8–34.3)
MCHC RBC AUTO-ENTMCNC: 32.5 G/DL (ref 31.4–37.4)
MCV RBC AUTO: 81 FL (ref 82–98)
MONOCYTES # BLD AUTO: 0.94 THOUSAND/ÂΜL (ref 0.17–1.22)
MONOCYTES NFR BLD AUTO: 10 % (ref 4–12)
NEUTROPHILS # BLD AUTO: 5.56 THOUSANDS/ÂΜL (ref 1.85–7.62)
NEUTS SEG NFR BLD AUTO: 62 % (ref 43–75)
NRBC BLD AUTO-RTO: 0 /100 WBCS
PLATELET # BLD AUTO: 308 THOUSANDS/UL (ref 149–390)
PMV BLD AUTO: 10.3 FL (ref 8.9–12.7)
RBC # BLD AUTO: 4.51 MILLION/UL (ref 3.81–5.12)
TIBC SERPL-MCNC: 351 UG/DL (ref 250–450)
UIBC SERPL-MCNC: 279 UG/DL (ref 155–355)
WBC # BLD AUTO: 9 THOUSAND/UL (ref 4.31–10.16)

## 2023-08-25 PROCEDURE — 83550 IRON BINDING TEST: CPT

## 2023-08-25 PROCEDURE — 83540 ASSAY OF IRON: CPT

## 2023-08-25 PROCEDURE — 36415 COLL VENOUS BLD VENIPUNCTURE: CPT

## 2023-08-25 PROCEDURE — 82728 ASSAY OF FERRITIN: CPT

## 2023-08-25 PROCEDURE — 85025 COMPLETE CBC W/AUTO DIFF WBC: CPT

## 2023-08-28 ENCOUNTER — TELEPHONE (OUTPATIENT)
Dept: HEMATOLOGY ONCOLOGY | Facility: CLINIC | Age: 48
End: 2023-08-28

## 2023-08-28 NOTE — TELEPHONE ENCOUNTER
Appointment Schedule   Who are you speaking with? Patient   If it is not the patient, are they listed on an active communication consent form? N/A   Which provider is the appointment scheduled with? Peyton Covarrubias PA-C   At which location is the appointment scheduled for? [facility]   When is the appointment scheduled? Please list date and time 8/29/23 @ 11am   What is the reason for this appointment? follow up; labs done 8/25/23 as per Neri Boo to schedule appt with Mark Anthony Cedillo   Did patient voice understanding of the details of this appointment? Yes   Was the no show policy reviewed with patient?  Yes

## 2023-08-29 ENCOUNTER — TELEPHONE (OUTPATIENT)
Dept: HEMATOLOGY ONCOLOGY | Facility: CLINIC | Age: 48
End: 2023-08-29

## 2023-08-29 ENCOUNTER — OFFICE VISIT (OUTPATIENT)
Dept: HEMATOLOGY ONCOLOGY | Facility: CLINIC | Age: 48
End: 2023-08-29
Payer: COMMERCIAL

## 2023-08-29 VITALS
BODY MASS INDEX: 45.08 KG/M2 | HEIGHT: 62 IN | HEART RATE: 78 BPM | OXYGEN SATURATION: 97 % | TEMPERATURE: 98 F | WEIGHT: 245 LBS | SYSTOLIC BLOOD PRESSURE: 126 MMHG | RESPIRATION RATE: 16 BRPM | DIASTOLIC BLOOD PRESSURE: 84 MMHG

## 2023-08-29 DIAGNOSIS — N92.0 MENORRHAGIA WITH REGULAR CYCLE: ICD-10-CM

## 2023-08-29 DIAGNOSIS — Z86.711 HISTORY OF PULMONARY EMBOLISM: Primary | ICD-10-CM

## 2023-08-29 DIAGNOSIS — D50.0 IRON DEFICIENCY ANEMIA DUE TO CHRONIC BLOOD LOSS: ICD-10-CM

## 2023-08-29 PROCEDURE — 99214 OFFICE O/P EST MOD 30 MIN: CPT | Performed by: INTERNAL MEDICINE

## 2023-08-29 RX ORDER — RIVAROXABAN 20 MG/1
20 TABLET, FILM COATED ORAL
Qty: 90 TABLET | Refills: 1 | Status: SHIPPED | OUTPATIENT
Start: 2023-08-29

## 2023-08-29 RX ORDER — SODIUM CHLORIDE 9 MG/ML
20 INJECTION, SOLUTION INTRAVENOUS ONCE
OUTPATIENT
Start: 2023-09-12

## 2023-08-29 NOTE — PROGRESS NOTES
HEMATOLOGY CLINIC NOTE    Primary Care Provider: Neeta Gonsalez MD  Referring Provider:    MRN: [id number]  : 1975    Assessment / Plan:   1. History of pulmonary embolism  This is a 81-year-old female with a history of recurrent PE, frequent miscarriage who presents for discussion of the preceding. She had hypercoagulable work-up 2022 & 2023 which showed negative testing specifically for antiphospholipid syndrome. She is taking Xarelto 20 mg once daily without any issues.       Due to her recurrent PE history, occupation with working from home, she will continue lifelong AC with Xarelto. Could consider dropping to prophylactic dosage in the future. - Xarelto 20 MG tablet; Take 1 tablet (20 mg total) by mouth daily with dinner  Dispense: 90 tablet; Refill: 1    Patient understands while being on anticoagulation that should she be in a significant accident such as a car accident and/or hit her head she should report to the ED to rule out fatal bleed. She understands as well that it is rare to have a blood clot on top of anticoagulation. However, should she develop significant chest pain, shortness of breath, leg swelling once again she should report to the ED.      2. Iron deficiency anemia due to chronic blood loss  3. Menorrhagia with regular cycle  Hx of fibroids. Still has menorrhagia and did not see OBGYN yet. S/P Venofer 300mg x 4 1-2023 and tolerated well. Most recent labs - 2023: WBC 9.0, Hgb 11.8, MCV 81, MCH 26.2, MCHC 32.5, PLT 308K, diff normal. Ferritin 11, iron 72, TIBC 351, iron sat 21%. She will be scheduled for Venofer 300mg x 2. Repeat labs Q3m with CBC-D, iron panel. F/U with OBGYN regarding menorrhagia as patient understands solving menorrhagia will help solve VIOLETTA. Otherwise, recurrent IV iron will be required.  She can take oral iron EOD with Vitamin C after IV iron has completed.     - CBC and differential; Standing  - Iron Panel (Includes Ferritin, Iron Sat%, Iron, and TIBC); Standing     • Discussion of decision making     I personally reviewed the following lab results, the image studies, pathology, other specialty/physicians consult notes and recommendations, and outside medical records from [facility]. I had a lengthy discussion with the patient and shared the work-up findings. I spent 35 minutes reviewing the records (labs, clinician notes, outside records, medical history, ordering medicine/tests/procedures, interpreting the imaging/labs previously done) and coordination of care as well as direct time with the patient today, of which greater than 50% of the time was spent in counseling and coordination of care with the patient/family.     • Plan/Labs  ? Continue lifelong anticoagulation. Continue Xarelto 20 mg once daily. May consider prophylactic dose in the future. ? Venofer 300mg x 2. CBC-D, iron panel Q3m  ? F/U with OBGYN     Follow Up: 6 months      All questions were answered to the patient's satisfaction during this encounter. The patient knows the contact information for our office and knows to reach out for any relevant concerns related to this encounter. They are to call for any temperature 100.4 or higher, new symptoms including but not restricted to shaking chills, decreased appetite, nausea, vomiting, diarrhea, increased fatigue, shortness of breath or chest pain, confusion, and not feeling the strength to come to the clinic. For all other listed problems and medical diagnosis in their chart - they are managed by PCP and/or other specialists, which the patient acknowledges. Thank you very much for your consultation and making us a part of this patient's care. We are continuing to follow closely with you. Please do not hesitate to reach out to me with any additional questions or concerns. Reason for visit:       Chief Complaint   Patient presents with   • Follow-up       History of Hematology Illness:     aYir Cheema is a 57 y.o. female who came in for consultation. 1. Pulmonary Embolism, bilateral  2. History of multiple miscarriages   - 2017: Patient had bilateral PE with possible heart strain. Diagnosed in [facility]. Was placed on Xarelto 20mg once daily for 7 months. Then taken off due to menstrual cycle increased.     - 2020/2021: Patient had bilateral PE again. Diagnosed in [facility]. Placed on Xarelto again.  - Patient had history of 4 miscarriages. All in first trimester. Last pregnancy was in 2020/2021.  - Possible provoking factor for 2017 is long travel in car sometimes for entire days on weekend with . Patient currently working from home sitting for long periods of time. Currently on lifelong AC due to recurrent PE + occupational history     - 11/2022: Patient was tested with factor V Leiden mutation, lupus anticoagulant, protein S total antigen, protein S functional testing, protein C functional testing, factor II, beta-2 glycoprotein antibodies, Antithrombin III activity, anticardiolipin antibody all of which were unrevealing. Only testing not done was prothrombin gene mutation.     3. VIOLETTA likely secondary to Menorrhagia  - patient recently diagnosed with fibroids  - 1/4/23: WBC 11.27, Hgb 10.7, MCV 75, PLT 3131K, ANC 8.54, rest of diff acceptable. No iron panel.   - 1-2/2023: S/P Venofer 300mg x 4 since Feraheme was not approved by insurance   - 8/2023: WBC 9.0, Hgb 11.8, MCV 81, MCH 26.2, MCHC 32.5, PLT 308K, diff normal. Ferritin 11, iron 72, TIBC 351, iron sat 21%. Interval History:   1/11/2023: This is a 41year old female with a PMH of asthma, infertility, PE, FHx sickle cell trait, overweight presenting for consultation.     Patient believes she may have seen hematology in 2017. She is currently taking Xarelto 20 mg once daily without significant problem. She recently was diagnosed with a fibroid and is having very heavy menstrual cycle. Otherwise, no other bleeding. She is on lifelong anticoagulation.   No current chest pain, shortness of breath. Menstrual cycle lasts about 5 to 6 days with the first 3 heaviest.  She uses 3 pads at a time on heaviest days and needs to change this every 2 hours.     She does have fatigue,  especially surrounding menorrhagia. She has pica for ice. Slight lightheadedness, dizziness. Does not have energy to perform some ADLs.    Patient has no other VTE other than above. patient has no family history of VTE. She does have a son with sickle cell trait. She has no liver or kidney conditions. Prior to VTE, no trauma, severe immobilization, surgery, hospitalization, birth control use. She did have some travel prior to 2017 PE as above. She does not smoke. No CVA or MI history. Miscarriage history as above. No stillborn birth or this carriages after  first trimester. No autoimmune conditions she knows of. She drinks occasionally. Not daily. She works from home for a [employer]. She has never had cancer personally. No family history of cancer. She is due for mammogram as last one was 2022.      2023: Patient came in for follow-up. She still has menorrhagia and has not seen OB/GYN yet. She does have fatigue despite sleeping 8 or more hours. She also has occasional lightheadedness, dizziness. No severe chest pain, shortness of breath. No bleeding into urine or stools. She does not have pica. Does have some mild restless legs 2. No other acute complaints. Still taking Xarelto. No new one-sided leg swelling. Does occasionally have mild bilateral leg swelling with working (sits long hours working from home), recent weight gain.    Problem list:       Patient Active Problem List   Diagnosis   • History of pulmonary embolism   • Encounter for preconception consultation   • Family history of sickle cell trait   • Mild intermittent asthma   • BMI 40.0-44.9, adult ([address])   • Infertility, female   • History of 2  sections   • History of  death   • Iron deficiency anemia due to chronic blood loss       REVIEW OF SYMPTOMS:   Review of Systems   Constitutional: Positive for fatigue. Negative for activity change, appetite change, chills, diaphoresis, fever and unexpected weight change. Respiratory: Negative for cough and shortness of breath. Cardiovascular: Positive for leg swelling (mild bilateral, occassional, symmetrical leg swelling with sitting long periods). Negative for chest pain and palpitations. Gastrointestinal: Negative for abdominal pain, anal bleeding, blood in stool, constipation, diarrhea, nausea and vomiting. Endocrine: Negative for cold intolerance. Genitourinary: Positive for menstrual problem and vaginal bleeding. Negative for hematuria. Skin: Negative for color change, pallor and rash. Neurological: Positive for dizziness and light-headedness. Negative for syncope and headaches. Hematological: Negative for adenopathy. Does not bruise/bleed easily. Psychiatric/Behavioral: Negative for sleep disturbance. PHYSICAL EXAMINATION:     Vital Signs:   /84 (BP Location: Left arm, Patient Position: Sitting, Cuff Size: Standard)   Pulse 78   Temp 98 °F (36.7 °C) (Temporal)   Resp 16   Ht 5' 2" (1.575 m)   Wt 111 kg (245 lb)   SpO2 97%   BMI 44.81 kg/m²   Body surface area is 2.08 meters squared.    Ht Readings from Last 8 Encounters:   23 5' 2" (1.575 m)   23 5' 2" (1.575 m)   23 5' 2" (1.575 m)   22 5' 2" (1.575 m)   21 5' 2" (1.575 m)       Wt Readings from Last 8 Encounters:   23 111 kg (245 lb)   23 110 kg (243 lb)   23 106 kg (234 lb)   22 109 kg (240 lb)   21 102 kg (225 lb 15.5 oz)          Vital Signs:   /84 (BP Location: Left arm, Patient Position: Sitting, Cuff Size: Standard)   Pulse 78   Temp 98 °F (36.7 °C) (Temporal)   Resp 16   Ht 5' 2" (1.575 m)   Wt 111 kg (245 lb)   SpO2 97%   BMI 44.81 kg/m²   Body surface area is 2.08 meters squared. Ht Readings from Last 8 Encounters:   08/29/23 5' 2" (1.575 m)   01/11/23 5' 2" (1.575 m)   01/04/23 5' 2" (1.575 m)   11/07/22 5' 2" (1.575 m)   05/27/21 5' 2" (1.575 m)           Wt Readings from Last 8 Encounters:   08/29/23 111 kg (245 lb)   01/11/23 110 kg (243 lb)   01/04/23 106 kg (234 lb)   11/07/22 109 kg (240 lb)   05/27/21 102 kg (225 lb 15.5 oz)          Physical Exam  Constitutional:       General: She is not in acute distress. Appearance: Normal appearance. She is not ill-appearing, toxic-appearing or diaphoretic. HENT:      Head: Normocephalic and atraumatic. Eyes:      General: No scleral icterus. Extraocular Movements: Extraocular movements intact. Conjunctiva/sclera: Conjunctivae normal.      Pupils: Pupils are equal, round, and reactive to light. Cardiovascular:      Rate and Rhythm: Normal rate and regular rhythm. Heart sounds: Normal heart sounds. No murmur heard. Pulmonary:      Effort: Pulmonary effort is normal. No respiratory distress. Breath sounds: Normal breath sounds. No stridor. No wheezing, rhonchi or rales. Musculoskeletal:         General: No tenderness. Normal range of motion. Cervical back: Normal range of motion and neck supple. Right lower leg: Edema (very mild, symmetrical bilateral ankle edema. ) present. Left lower leg: Edema present. Lymphadenopathy:      Cervical: No cervical adenopathy. Skin:     General: Skin is warm and dry. Coloration: Skin is not jaundiced or pale. Findings: No bruising, erythema, lesion or rash. Neurological:      General: No focal deficit present. Mental Status: She is alert and oriented to person, place, and time. Mental status is at baseline. Motor: No weakness. Psychiatric:         Mood and Affect: Mood normal.         Behavior: Behavior normal.         Thought Content:  Thought content normal.         Judgment: Judgment normal.     Reviewed historical information. PAST MEDICAL HISTORY:    Past Medical History:   Diagnosis Date   • Asthma     history, no symptoms in years, inhaler ("starts with an A") is    • Pulmonary embolism ([address])     2017, bilateral, put on Xarelto, took with NSAIDs and had adnexal hemorrhage complication; recurred in 2019 now is on lifelong Xarelto, managed by pulmonologist       PAST SURGICAL HISTORY:    Past Surgical History:   Procedure Laterality Date   • APPENDECTOMY     •  SECTION      x2 (, and in , with intervening TOLAC)   • COLONOSCOPY      benign polyp removed   • KNEE SURGERY Right    • SHOULDER SURGERY Right          CURRENT MEDICATIONS:     Current Outpatient Medications:   •  albuterol (ProAir HFA) 90 mcg/act inhaler, Inhale 2 puffs every 6 (six) hours as needed for wheezing, Disp: 25.5 g, Rfl: 3  •  atorvastatin (LIPITOR) 20 mg tablet, Take 20 mg by mouth daily, Disp: , Rfl:   •  multivitamin (THERAGRAN) TABS, Take 1 tablet by mouth daily, Disp: , Rfl:   •  Omega-3-acid Ethyl Esters & D3 1 & 1000 GM & UNIT KIT, Take 2 g by mouth daily, Disp: , Rfl:   •  Xarelto 20 MG tablet, Take 1 tablet (20 mg total) by mouth daily with dinner, Disp: 90 tablet, Rfl: 1  •  folic acid (FOLVITE) 299 MCG tablet, Take 1 tablet (800 mcg total) by mouth daily (Patient not taking: Reported on 2023), Disp: 90 tablet, Rfl: 5  •  ondansetron (ZOFRAN-ODT) 4 mg disintegrating tablet, Take 1 tablet (4 mg total) by mouth every 6 (six) hours as needed for nausea or vomiting (Patient not taking: Reported on 2023), Disp: 20 tablet, Rfl: 0    Tobacco Use   • Sm  Social History     Tobacco Use   • Smoking status: Never   • Smokeless tobacco: Never   Vaping Use   • Vaping Use: Never used   Substance Use Topics   • Alcohol use:  Yes     Alcohol/week: 5.0 standard drinks of alcohol     Types: 5 Glasses of wine per week   • Drug use: Never   oking status: Never   • Smokeless tobacco: Never   Vaping Use • Vaping Use: Never used   Substance Use Topics   • Alcohol use: Yes     Alcohol/week: 5.0 standard drinks of alcohol     Types: 5 Glasses of wine per week   • Drug use: Never      Problem Relation Age of Onset   • Asthma Brother    • Asthma Son        ALLERGIES:    Allergies   Allergen Reactions   • Pollen Extract Sneezing       Lab Re  Lab Results   Component Value Date    WBC 9.00 08/25/2023    HGB 11.8 08/25/2023    HCT 36.3 08/25/2023    MCV 81 (L) 08/25/2023     08/25/2023   sults   Component Value Date    WBC 9.00 08/25/2023    HGB 11.8 08/25/2023    HCT 36.3 08/25/2023    MCV 81 (L) 08/25/2023     08/25/2023      Component Value Date    SODIUM 139 01/04/2023    K 4.5 01/04/2023     01/04/2023    CO2 24 01/04/2023    AGAP 11 01/04/2023    BUN 10 01/04/2023    CREATININE 1.08 01/04/2023    GLUC 139 01/04/2023    CALCIUM 9.2 01/04/2023    AST 19 01/04/2023    ALT 23 01/04/2023    ALKPHOS 54 01/04/2023    TP 7.8 01/04/2023    TBILI 0.16 (L) 01/04/2023    EGFR 62 01/04/2023       IMAGING:  CT abdomen pelvis with contrast  Narrative: CT ABDOMEN AND PELVIS WITH IV CONTRAST    INDICATION:   LLQ abdominal pain  lower abdominal pain. COMPARISON:  None. TECHNIQUE:  CT examination of the abdomen and pelvis was performed. Axial, sagittal, and coronal 2D reformatted images were created from the source data and submitted for interpretation. Radiation dose length product (DLP) for this visit:  836 mGy-cm . This examination, like all CT scans performed in the [facility], was performed utilizing techniques to minimize radiation dose exposure, including the use of iterative   reconstruction and automated exposure control. IV Contrast:  100 mL of iohexol (OMNIPAQUE)  Enteric Contrast:  Enteric contrast was not administered.     FINDINGS:    ABDOMEN    LOWER CHEST:  No clinically significant abnormality identified in the visualized lower chest.    LIVER/BILIARY TREE:  1.1 cm hypervascular focus in segment 4 of the liver most likely represents a hemangioma. Liver morphology is normal.l    GALLBLADDER:  Probable small gallstones. No gallbladder wall thickening or pericholecystic fluid. SPLEEN:  Unremarkable. PANCREAS:  Unremarkable. ADRENAL GLANDS:  Unremarkable. KIDNEYS/URETERS:  Unremarkable. No hydronephrosis. STOMACH AND BOWEL:  Unremarkable. APPENDIX:  No findings to suggest appendicitis. ABDOMINOPELVIC CAVITY:  No ascites. No pneumoperitoneum. No lymphadenopathy. VESSELS:  Unremarkable for patient's age. PELVIS    REPRODUCTIVE ORGANS:  Ill-defined area of decreased enhancement in the myometrium left mid uterus likely represent either a fibroid or focal adenomyosis. URINARY BLADDER:  Unremarkable. ABDOMINAL WALL/INGUINAL REGIONS:  Diastases of the linea alba, and small fat-containing umbilical hernia. OSSEOUS STRUCTURES:  No acute fracture or destructive osseous lesion. Impression: No acute inflammatory process in the abdomen or the pelvis.     Workstation performed: [id number]

## 2023-09-13 ENCOUNTER — TELEPHONE (OUTPATIENT)
Dept: HEMATOLOGY ONCOLOGY | Facility: CLINIC | Age: 48
End: 2023-09-13

## 2023-09-13 ENCOUNTER — HOSPITAL ENCOUNTER (OUTPATIENT)
Dept: INFUSION CENTER | Facility: CLINIC | Age: 48
Discharge: HOME | End: 2023-09-13
Payer: COMMERCIAL

## 2023-09-13 VITALS
SYSTOLIC BLOOD PRESSURE: 118 MMHG | TEMPERATURE: 98.1 F | RESPIRATION RATE: 18 BRPM | DIASTOLIC BLOOD PRESSURE: 57 MMHG | HEART RATE: 92 BPM

## 2023-09-13 DIAGNOSIS — D50.0 IRON DEFICIENCY ANEMIA DUE TO CHRONIC BLOOD LOSS: Primary | ICD-10-CM

## 2023-09-13 DIAGNOSIS — Z86.711 HISTORY OF PULMONARY EMBOLISM: ICD-10-CM

## 2023-09-13 PROCEDURE — 96365 THER/PROPH/DIAG IV INF INIT: CPT

## 2023-09-13 RX ORDER — RIVAROXABAN 20 MG/1
20 TABLET, FILM COATED ORAL
Qty: 90 TABLET | Refills: 1 | Status: SHIPPED | OUTPATIENT
Start: 2023-09-13

## 2023-09-13 RX ORDER — SODIUM CHLORIDE 9 MG/ML
20 INJECTION, SOLUTION INTRAVENOUS ONCE
Status: COMPLETED | OUTPATIENT
Start: 2023-09-13 | End: 2023-09-13

## 2023-09-13 RX ORDER — SODIUM CHLORIDE 9 MG/ML
20 INJECTION, SOLUTION INTRAVENOUS ONCE
OUTPATIENT
Start: 2023-09-19

## 2023-09-13 RX ADMIN — IRON SUCROSE 300 MG: 20 INJECTION, SOLUTION INTRAVENOUS at 15:55

## 2023-09-13 RX ADMIN — SODIUM CHLORIDE 20 ML/HR: 0.9 INJECTION, SOLUTION INTRAVENOUS at 15:53

## 2023-09-13 NOTE — TELEPHONE ENCOUNTER
Medication Refill Request   Who are you speaking with? Patient   If it is not the patient, are they listed on an active communication consent form? N/A   Which medication is being requested for refill? Please list medication name and dosage Xarelto 20 MG tablet        How many pills does the patient have left? 1   Preferred Pharmacy / Address [facility] pharmacy [address] [place] [address]   Who is the prescribing provider?  [address]   Call back number [phone]   Relevant Information The order was sent to [facility] which is no longer open

## 2023-09-13 NOTE — PROGRESS NOTES
Pt presents for iv venofer, offers no complaints, tolerating venofer thus far, hand off report provided to Ivett Whitaker

## 2023-10-24 ENCOUNTER — TELEPHONE (OUTPATIENT)
Dept: BARIATRICS | Facility: CLINIC | Age: 48
End: 2023-10-24

## 2023-10-24 NOTE — TELEPHONE ENCOUNTER
Yoselyn for pt to call back [facility] or [facility] to schedule provider appt since she is interested in starting [facility]

## 2023-12-02 ENCOUNTER — APPOINTMENT (OUTPATIENT)
Dept: LAB | Facility: HOSPITAL | Age: 48
End: 2023-12-02
Payer: COMMERCIAL

## 2023-12-02 DIAGNOSIS — Z13.29 SCREENING FOR THYROID DISORDER: ICD-10-CM

## 2023-12-02 DIAGNOSIS — Z11.3 SCREENING EXAMINATION FOR VENEREAL DISEASE: ICD-10-CM

## 2023-12-02 DIAGNOSIS — Z11.59 SCREENING EXAMINATION FOR POLIOMYELITIS: ICD-10-CM

## 2023-12-02 DIAGNOSIS — Z13.0 SCREENING FOR IRON DEFICIENCY ANEMIA: ICD-10-CM

## 2023-12-02 DIAGNOSIS — Z11.4 SCREENING FOR HUMAN IMMUNODEFICIENCY VIRUS: ICD-10-CM

## 2023-12-02 LAB
BASOPHILS # BLD AUTO: 0.07 THOUSANDS/ÂΜL (ref 0–0.1)
BASOPHILS NFR BLD AUTO: 1 % (ref 0–1)
EOSINOPHIL # BLD AUTO: 0.25 THOUSAND/ÂΜL (ref 0–0.61)
EOSINOPHIL NFR BLD AUTO: 3 % (ref 0–6)
ERYTHROCYTE [DISTWIDTH] IN BLOOD BY AUTOMATED COUNT: 17.2 % (ref 11.6–15.1)
FERRITIN SERPL-MCNC: 16 NG/ML (ref 11–307)
HCT VFR BLD AUTO: 39.1 % (ref 34.8–46.1)
HGB BLD-MCNC: 12.8 G/DL (ref 11.5–15.4)
IMM GRANULOCYTES # BLD AUTO: 0.02 THOUSAND/UL (ref 0–0.2)
IMM GRANULOCYTES NFR BLD AUTO: 0 % (ref 0–2)
IRON SATN MFR SERPL: 24 % (ref 15–50)
IRON SERPL-MCNC: 69 UG/DL (ref 50–212)
LYMPHOCYTES # BLD AUTO: 2.02 THOUSANDS/ÂΜL (ref 0.6–4.47)
LYMPHOCYTES NFR BLD AUTO: 28 % (ref 14–44)
MCH RBC QN AUTO: 26.1 PG (ref 26.8–34.3)
MCHC RBC AUTO-ENTMCNC: 32.7 G/DL (ref 31.4–37.4)
MCV RBC AUTO: 80 FL (ref 82–98)
MONOCYTES # BLD AUTO: 0.58 THOUSAND/ÂΜL (ref 0.17–1.22)
MONOCYTES NFR BLD AUTO: 8 % (ref 4–12)
NEUTROPHILS # BLD AUTO: 4.31 THOUSANDS/ÂΜL (ref 1.85–7.62)
NEUTS SEG NFR BLD AUTO: 60 % (ref 43–75)
NRBC BLD AUTO-RTO: 0 /100 WBCS
PLATELET # BLD AUTO: 268 THOUSANDS/UL (ref 149–390)
PMV BLD AUTO: 10.4 FL (ref 8.9–12.7)
RBC # BLD AUTO: 4.91 MILLION/UL (ref 3.81–5.12)
TIBC SERPL-MCNC: 283 UG/DL (ref 250–450)
TSH SERPL DL<=0.05 MIU/L-ACNC: 1.54 UIU/ML (ref 0.45–4.5)
UIBC SERPL-MCNC: 214 UG/DL (ref 155–355)
WBC # BLD AUTO: 7.25 THOUSAND/UL (ref 4.31–10.16)

## 2023-12-02 PROCEDURE — 84443 ASSAY THYROID STIM HORMONE: CPT

## 2023-12-02 PROCEDURE — 86803 HEPATITIS C AB TEST: CPT

## 2023-12-02 PROCEDURE — 87340 HEPATITIS B SURFACE AG IA: CPT

## 2023-12-02 PROCEDURE — 87389 HIV-1 AG W/HIV-1&-2 AB AG IA: CPT

## 2023-12-02 PROCEDURE — 86780 TREPONEMA PALLIDUM: CPT

## 2023-12-02 PROCEDURE — 36415 COLL VENOUS BLD VENIPUNCTURE: CPT

## 2023-12-03 LAB
HBV SURFACE AG SER QL: NORMAL
HIV 1+2 AB+HIV1 P24 AG SERPL QL IA: NORMAL
HIV 2 AB SERPL QL IA: NORMAL
HIV1 AB SERPL QL IA: NORMAL
HIV1 P24 AG SERPL QL IA: NORMAL
TREPONEMA PALLIDUM IGG+IGM AB [PRESENCE] IN SERUM OR PLASMA BY IMMUNOASSAY: NORMAL

## 2023-12-05 LAB
HCV AB S/CO SERPL IA: NON REACTIVE
SL AMB INTERPRETATION: NORMAL

## 2023-12-13 ENCOUNTER — TELEPHONE (OUTPATIENT)
Dept: HEMATOLOGY ONCOLOGY | Facility: CLINIC | Age: 48
End: 2023-12-13

## 2023-12-13 NOTE — TELEPHONE ENCOUNTER
I called Hinasybil Jeff regarding an appointment that they have scheduled with [facility]  scheduled on  2/29/24        Appointment Change  Cancel, Reschedule, Change to Virtual      Who are you speaking with? Patient   If it is not the patient, is the caller listed on the communication consent form? N/A   Which provider is the appointment scheduled with? [facility]    When was the original appointment scheduled? Please list date and time 2/29/24 8:00am   At which location is the appointment scheduled to take place? [facility]   Was the appointment rescheduled? Was the appointment changed from an in person visit to a virtual visit? If so, please list the details of the change. 2/29/24 8:30am   What is the reason for the appointment change? Provider schedule, approved accommodations       Was STAR transport scheduled? No   Does STAR transport need to be scheduled for the new visit (if applicable) No   Does the patient need an infusion appointment rescheduled? No   Does the patient have an upcoming infusion appointment scheduled? If so, when? No   Is the patient undergoing chemotherapy? No   For appointments cancelled with less than 24 hours:  Was the no-show policy reviewed?  Yes

## 2023-12-26 DIAGNOSIS — D50.0 IRON DEFICIENCY ANEMIA DUE TO CHRONIC BLOOD LOSS: Primary | ICD-10-CM

## 2023-12-26 RX ORDER — SODIUM CHLORIDE 9 MG/ML
20 INJECTION, SOLUTION INTRAVENOUS ONCE
OUTPATIENT
Start: 2023-12-28

## 2024-01-09 ENCOUNTER — OFFICE VISIT (OUTPATIENT)
Dept: BARIATRICS | Facility: CLINIC | Age: 49
End: 2024-01-09
Payer: COMMERCIAL

## 2024-01-09 VITALS
DIASTOLIC BLOOD PRESSURE: 70 MMHG | HEIGHT: 64 IN | WEIGHT: 243.6 LBS | BODY MASS INDEX: 41.59 KG/M2 | SYSTOLIC BLOOD PRESSURE: 116 MMHG | HEART RATE: 76 BPM | RESPIRATION RATE: 18 BRPM | OXYGEN SATURATION: 97 %

## 2024-01-09 DIAGNOSIS — E66.01 MORBID OBESITY (HCC): Primary | ICD-10-CM

## 2024-01-09 DIAGNOSIS — F41.9 ANXIETY: ICD-10-CM

## 2024-01-09 PROCEDURE — 99204 OFFICE O/P NEW MOD 45 MIN: CPT | Performed by: FAMILY MEDICINE

## 2024-01-09 PROCEDURE — 99214 OFFICE O/P EST MOD 30 MIN: CPT | Performed by: FAMILY MEDICINE

## 2024-01-09 RX ORDER — TIRZEPATIDE 2.5 MG/.5ML
2.5 INJECTION, SOLUTION SUBCUTANEOUS WEEKLY
Qty: 2 ML | Refills: 0 | Status: SHIPPED | OUTPATIENT
Start: 2024-01-09 | End: 2024-02-06

## 2024-01-09 RX ORDER — CLONIDINE HYDROCHLORIDE 0.1 MG/1
TABLET ORAL
COMMUNITY
Start: 2023-11-28

## 2024-01-09 NOTE — PROGRESS NOTES
Assessment/Plan:  Yoselyn was seen today for consult.    Diagnoses and all orders for this visit:    Morbid obesity (HCC)  -     Ambulatory referral to Sleep Medicine; Future  -     tirzepatide (Zepbound) 2.5 mg/0.5 mL auto-injector; Inject 0.5 mL (2.5 mg total) under the skin once a week for 28 days    BMI 40.0-44.9, adult (HCC)  -     Ambulatory referral to Sleep Medicine; Future  -     tirzepatide (Zepbound) 2.5 mg/0.5 mL auto-injector; Inject 0.5 mL (2.5 mg total) under the skin once a week for 28 days    Anxiety    Advised Contrave could help but be aware of side effects, check their website and if desired might try   She would like to avoid Topamax   She tried in the past Diethylpropion with 12 lbs loss,later regained back,  advised may be addictive and she declines to restart  Will avoid Phentermine due to potential increase in anxiety and currently her using Clonidine for it  - Patient denies personal history of pancreatitis. Patient also denies personal and family history of medullary thyroid cancer and multiple endocrine neoplasia type 2 (MEN 2 tumor). Patient understands these major side effects and agrees to start the medication.  Contraception methods needed and encouraged, risk for  fetal harm discussed.   Labs reviewed normal thyroid no diabetes  Advised Zepbound might not be covered   Obesity:   Weight not at goal and patient tried more than 6 months to lose weight and was not able to achieve a meaningful weight loss above 5%  - Discussed options of [facility]-Intensive Lifestyle Intervention Program, Conservative Program, Fabby-En-Y Gastric Bypass, and Vertical Sleeve Gastrectomy and the role of weight loss medications.  - Patient is interested in pursuing Conservative Program  - Initial weight loss goal of 5-10% weight loss for improved health  - Weight loss can improve patient's co-morbid conditions and/or prevent weight-related complications.  Motivational interview performed and patient noted  "changes to work on until next visit  Handouts provided:  Bariatric surgery: she went to a 6 mo process but not approved by insurance    Calorie goal handouts provided    Hydration: 64oz fluid, no sugary drinks  Goal 3 meals per day  Food log encouraged , phone guille or paper journal  Increase physical activity by 10 minutes daily.   Return in 3 mo    Subjective:   Chief Complaint   Patient presents with    Consult     Initial Consultation. SB =  8.  Waist:  inches       Patient ID: Yoselyn Benjamin  is a 48 y.o. female with excess weight/obesity here to pursue weight management.  Previous notes and records have been reviewed.        HPI  Wt Readings from Last 20 Encounters:   24 110 kg (243 lb 9.6 oz)   23 111 kg (245 lb)   23 110 kg (243 lb)   23 106 kg (234 lb)   22 109 kg (240 lb)   21 102 kg (225 lb 15.5 oz)     Obesity/Excess Weight:Body mass index is 42.47 kg/m².    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 , one day driving to [place]  Sleep:  STOP ban/8    Past Medical History:   Diagnosis Date    Asthma     history, no symptoms in years, inhaler (\"starts with an A\") is     Pulmonary embolism (HCC)     2017, bilateral, put on Xarelto, took with NSAIDs and had adnexal hemorrhage complication; recurred in 2019 now is on lifelong Xarelto, managed by pulmonologist     Past " "Surgical History:   Procedure Laterality Date    APPENDECTOMY       SECTION      x2 (, and in , with intervening TOLAC)    COLONOSCOPY      benign polyp removed    KNEE SURGERY Right     SHOULDER SURGERY Right      The following portions of the patient's history were reviewed and updated as appropriate: allergies, current medications, past family history, past medical history, past social history, past surgical history, and problem list.    ROS:  Review of Systems   Constitutional: Negative for activity change. Fatigue  HENT: Negative for trouble swallowing.    Respiratory: Negative for shortness of breath.    Cardiovascular: Negative for chest pain, edema  Gastrointestinal: Negative for abdominal pain, nausea and vomiting,sometimes  acid reflux, constipation/diarrhea  Endocrine: negative for heat /cold intolerance regular menses heavy  Musculoskeletal: Negative for gait problem and myalgias.   Psychiatric/Behavioral: + anxiety   Objective:  /70 (BP Location: Left arm, Patient Position: Sitting, Cuff Size: Adult)   Pulse 76   Resp 18   Ht 5' 3.5\" (1.613 m)   Wt 110 kg (243 lb 9.6 oz)   SpO2 97%   BMI 42.47 kg/m²   Constitutional: Well-developed, well-nourished and Obese Body mass index is 42.47 kg/m².. Alert, cooperative.  HEENT: No conjunctival pallor or jaundice  Pulmonary: No increased work of breathing or signs of respiratory distress.  CV: Well-perfused, Normal rate    Vascular: no peripheral edema  GI: Abdomen obese, Non-distended  MSK: no sarcopenia noted   Neuro: Oriented to person, place and time. Normal Speech  Psych: Anxious affect and mood. Normal thought process, no delusions    Labs and Imaging  Recent labs and imaging have been personally reviewed.  Lab Results   Component Value Date    WBC 7.25 2023    HGB 12.8 2023    HCT 39.1 2023    MCV 80 (L) 2023     2023     Lab Results   Component Value Date    SODIUM 139 2023    K 4.5 " 01/04/2023     01/04/2023    CO2 24 01/04/2023    AGAP 11 01/04/2023    BUN 10 01/04/2023    CREATININE 1.08 01/04/2023    GLUC 139 01/04/2023    CALCIUM 9.2 01/04/2023    AST 19 01/04/2023    ALT 23 01/04/2023    ALKPHOS 54 01/04/2023    TP 7.8 01/04/2023    TBILI 0.16 (L) 01/04/2023    EGFR 62 01/04/2023     Lab Results   Component Value Date    HGBA1C 5.5 01/11/2023     Lab Results   Component Value Date    KBW8JINWFFAX 1.537 12/02/2023

## 2024-02-20 ENCOUNTER — TELEPHONE (OUTPATIENT)
Dept: HEMATOLOGY ONCOLOGY | Facility: CLINIC | Age: 49
End: 2024-02-20

## 2024-02-22 ENCOUNTER — OFFICE VISIT (OUTPATIENT)
Dept: NEUROLOGY | Facility: CLINIC | Age: 49
End: 2024-02-22
Payer: COMMERCIAL

## 2024-02-22 VITALS
HEIGHT: 63 IN | HEART RATE: 91 BPM | OXYGEN SATURATION: 97 % | DIASTOLIC BLOOD PRESSURE: 68 MMHG | WEIGHT: 243.4 LBS | SYSTOLIC BLOOD PRESSURE: 108 MMHG | BODY MASS INDEX: 43.12 KG/M2

## 2024-02-22 DIAGNOSIS — E66.01 MORBID OBESITY (HCC): ICD-10-CM

## 2024-02-22 DIAGNOSIS — G47.19 EXCESSIVE DAYTIME SLEEPINESS: Primary | ICD-10-CM

## 2024-02-22 DIAGNOSIS — R06.83 SNORING: ICD-10-CM

## 2024-02-22 DIAGNOSIS — J35.1 ENLARGED TONSILS: ICD-10-CM

## 2024-02-22 PROCEDURE — 99204 OFFICE O/P NEW MOD 45 MIN: CPT | Performed by: INTERNAL MEDICINE

## 2024-02-22 NOTE — PROGRESS NOTES
Sleep Consultation   Yoselyn Benjamin 48 y.o. female MRN: [id number]      Reason for consultation: Snoring    Requesting physician: Greer De Paz MD bariatrics    Assessment/Plan    Suspected sleep apnea  Mallampati class 3, Body mass index is 43.12 kg/m²., Neck Circumference: 16.    He/she is at risk for obstructive sleep apnea based on STOP BANG survey based on snoring, tiredness, observed apneas, elevated BMI  S/s: Snoring, choking, gagging, snorting, witnessed apneas, excessive daytime sleepiness  Paoli score: 16  I discussed in depth the diagnostic studies and treatment options involved with obstructive sleep apnea  I also discussed in depth the risk of leaving sleep apnea untreated including hypertension, heart failure, arrhythmia, MI and stroke.  The patient is agreeable to undergo testing and treatment of obstructive sleep apnea.  He/she understands the pitfalls he/she may encounter along the way and is willing to attempt CPAP treatment.     Plan  Ordered home sleep study  Patient is amenable to CPAP    2.  Snoring  As above    3.  Excessive daytime sleepiness  As above    4.  Morbid Obesity  Counseled patient on lifestyle modifications including diet and exercise  Following with bariatrics  Prescribed zepbound but cost was too high  Unfortunately insurance denied gastric sleeve surgery    5.  Enlarged tonsils  Left tonsil more enlarged than right tonsil  Will consider ENT referral at next visit if still present  Patient states that has been present for 2 weeks, denies tenderness or exudate      History of Present Illness   HPI:  Yoselyn Benjamin is a 48 y.o. female with PMHx pulmonary embolism, asthma, morbid obesity who presents for evaluation of suspected sleep apnea.  She reports loud snoring and excessive daytime sleepiness.  Paoli score 16.  She feels her memory and concentration is worsened.      She was referred by bariatrics and previously was proceeding with gastric sleeve surgery.  However  "prior to the surgery insurance denied the procedure.  She was started on zepbound but felt the cost was too high.  She wakes up at night to urinate twice.  She goes to bed at 10 PM-12 AM.  She wakes up at 6 AM.  She is averaging 5 to 6 hours of sleep per night.  She does not take naps.      She drinks 8 ounces of coffee daily and occasionally drinks wine.          Review of Systems      Genitourinary none   Cardiology none   Gastrointestinal none   Neurology none   Constitutional none   Integumentary none   Psychiatry none   Musculoskeletal none   Pulmonary none   ENT none   Endocrine none   Hematological none         Historical Information   Past Medical History:   Diagnosis Date    Asthma     history, no symptoms in years, inhaler (\"starts with an A\") is     Pulmonary embolism (HCC)     2017, bilateral, put on Xarelto, took with NSAIDs and had adnexal hemorrhage complication; recurred in 2019 now is on lifelong Xarelto, managed by pulmonologist     Past Surgical History:   Procedure Laterality Date    APPENDECTOMY       SECTION      x2 (, and in , with intervening TOLAC)    COLONOSCOPY      benign polyp removed    KNEE SURGERY Right     SHOULDER SURGERY Right      Family History   Problem Relation Age of Onset    Asthma Brother     Asthma Son      Social History     Socioeconomic History    Marital status: /Civil Union     Spouse name: Not on file    Number of children: Not on file    Years of education: Not on file    Highest education level: Not on file   Occupational History    Not on file   Tobacco Use    Smoking status: Never    Smokeless tobacco: Never   Vaping Use    Vaping status: Never Used   Substance and Sexual Activity    Alcohol use: Yes     Alcohol/week: 5.0 standard drinks of alcohol     Types: 5 Glasses of wine per week    Drug use: Never    Sexual activity: Yes     Partners: Male     Birth control/protection: Abstinence   Other Topics Concern    Not on file   Social " "History Narrative    Not on file     Social Determinants of Health     Financial Resource Strain: Not on file   Food Insecurity: Not on file   Transportation Needs: Not on file   Physical Activity: Not on file   Stress: Not on file   Social Connections: Not on file   Intimate Partner Violence: Not on file   Housing Stability: Not on file       Occupational History: na    Meds/Allergies   Allergies   Allergen Reactions    Pollen Extract Sneezing       Home medications:  Prior to Admission medications    Medication Sig Start Date End Date Taking? Authorizing Provider   albuterol (ProAir HFA) 90 mcg/act inhaler Inhale 2 puffs every 6 (six) hours as needed for wheezing 11/7/22  Yes Melissa Liu MD   atorvastatin (LIPITOR) 20 mg tablet Take 20 mg by mouth daily 11/6/22  Yes Historical Provider, MD   multivitamin (THERAGRAN) TABS Take 1 tablet by mouth daily   Yes Historical Provider, MD   Xarelto 20 MG tablet Take 1 tablet (20 mg total) by mouth daily with dinner 9/13/23  Yes Peyton Covarrubias PA-C   cloNIDine (CATAPRES) 0.1 mg tablet TAKE 1 TABLET (0.1 MG) BY ORAL ROUTE 3 TIMES PER DAY AS NEEDED FOR ANXIETY AND IRRITABILITY  Patient not taking: Reported on 2/22/2024 11/28/23   Historical Provider, MD   folic acid (FOLVITE) 800 MCG tablet Take 1 tablet (800 mcg total) by mouth daily  Patient not taking: Reported on 8/29/2023 11/7/22   Melissa Liu MD   Hinxv-2-wflq Ethyl Esters & D3 1 & 1000 GM & UNIT KIT Take 2 g by mouth daily  Patient not taking: Reported on 1/9/2024    Historical Provider, MD   ondansetron (ZOFRAN-ODT) 4 mg disintegrating tablet Take 1 tablet (4 mg total) by mouth every 6 (six) hours as needed for nausea or vomiting  Patient not taking: Reported on 8/29/2023 1/4/23   Weston Marinelli PA-C       Vitals:   Blood pressure 108/68, pulse 91, height 5' 3\" (1.6 m), weight 110 kg (243 lb 6.4 oz), SpO2 97%, not currently breastfeeding.,  Body mass index is 43.12 kg/m².  Neck Circumference: " "16    Physical Exam  General: Awake alert and oriented x 3, conversant without conversational dyspnea, NAD, normal affect  HEENT:  PERRL, Sclera noninjected, nonicteric OU, Nares patent,  no craniofacial abnormalities, Mucous membranes, moist, no oral lesions, normal dentition, Mallampati class 3, enlarged 2+ tonsils, left tonsil more enlarged than right tonsil  NECK:  Trachea midline, no accessory muscle use, no stridor, no cervical or supraclavicular adenopathy, JVP not elevated  CARDIAC: Reg, single s1/S2, no m/r/g  PULM: CTA bilaterally no wheezing, rhonchi or rales  EXT: No cyanosis, no clubbing, no edema, normal capillary refill  NEURO: no focal neurologic deficits, AAOx3, moving all extremities appropriately    Labs: I have personally reviewed pertinent lab results.  Lab Results   Component Value Date    WBC 7.25 12/02/2023    HGB 12.8 12/02/2023    HCT 39.1 12/02/2023    MCV 80 (L) 12/02/2023     12/02/2023      Lab Results   Component Value Date    CALCIUM 9.2 01/04/2023    K 4.5 01/04/2023    CO2 24 01/04/2023     01/04/2023    BUN 10 01/04/2023    CREATININE 1.08 01/04/2023     Lab Results   Component Value Date    IRON 69 12/02/2023    TIBC 283 12/02/2023    FERRITIN 16 12/02/2023     No results found for: \"VXZCVSAH70\"  No results found for: \"FOLATE\"      Arterial Blood Gas result:  JUAN Lizarraga MD  [facility] Sleep Medicine   "

## 2024-02-23 ENCOUNTER — TELEPHONE (OUTPATIENT)
Dept: HEMATOLOGY ONCOLOGY | Facility: CLINIC | Age: 49
End: 2024-02-23

## 2024-02-26 ENCOUNTER — TELEPHONE (OUTPATIENT)
Dept: HEMATOLOGY ONCOLOGY | Facility: CLINIC | Age: 49
End: 2024-02-26

## 2024-02-26 NOTE — TELEPHONE ENCOUNTER
Swp reminding of labs to be done prior to the appt, the appt itself and patient expressed understanding

## 2024-02-29 ENCOUNTER — TELEPHONE (OUTPATIENT)
Dept: HEMATOLOGY ONCOLOGY | Facility: CLINIC | Age: 49
End: 2024-02-29

## 2024-02-29 NOTE — TELEPHONE ENCOUNTER
Appointment Schedule   Who are you speaking with? Patient   If it is not the patient, are they listed on an active communication consent form? N/A   Which provider is the appointment scheduled with? Haleigh Rodriguez    At which location is the appointment scheduled for? [place]   When is the appointment scheduled?  Please list date and time 3/8/24 3:00pm   What is the reason for this appointment? Follow up, (r/s to n/s on 2/29/24)    Labs done on 2/27/24   Did patient voice understanding of the details of this appointment? Yes   Was the no show policy reviewed with patient? Yes       Patient is unable to make her appointment due to having a mandatory meeting at work that was just scheduled.  Patient apologized for missing the appointment.     Patient already paid her co pay online for this appointment and would like to know if it will be applied to her new appointment on 3/8/24. Patient would like a call back [phone]  Patient will be at a work meeting from 2-3pm today, patient gave permission to leave a message.

## 2024-03-01 NOTE — TELEPHONE ENCOUNTER
I called and spoke to Yoselyn. I explained that we do not charge the card until she comes into the visit and checks in. I did explain that if she was to see the charge still come out it would apply to any balance she has on her account. She was understanding and thankful for the call.

## 2024-03-08 ENCOUNTER — TELEPHONE (OUTPATIENT)
Dept: HEMATOLOGY ONCOLOGY | Facility: CLINIC | Age: 49
End: 2024-03-08

## 2024-03-08 NOTE — TELEPHONE ENCOUNTER
I called and left a voicemail for the patient in regards to rescheduling her missed appt on 03/08/24 with Haleigh. I left the hope line number to call back.

## 2024-04-16 DIAGNOSIS — Z86.711 HISTORY OF PULMONARY EMBOLISM: ICD-10-CM

## 2024-04-17 ENCOUNTER — TELEPHONE (OUTPATIENT)
Dept: HEMATOLOGY ONCOLOGY | Facility: CLINIC | Age: 49
End: 2024-04-17

## 2024-04-17 RX ORDER — RIVAROXABAN 20 MG/1
20 TABLET, FILM COATED ORAL
Qty: 90 TABLET | Refills: 1 | Status: SHIPPED | OUTPATIENT
Start: 2024-04-17

## 2024-04-17 NOTE — TELEPHONE ENCOUNTER
Called patient in regards of scheduling f/u appt , per Haleigh Rodriguez . Left message with hope line tel number

## 2024-04-25 ENCOUNTER — OFFICE VISIT (OUTPATIENT)
Dept: BARIATRICS | Facility: CLINIC | Age: 49
End: 2024-04-25
Payer: COMMERCIAL

## 2024-04-25 VITALS
RESPIRATION RATE: 18 BRPM | SYSTOLIC BLOOD PRESSURE: 112 MMHG | DIASTOLIC BLOOD PRESSURE: 66 MMHG | BODY MASS INDEX: 40.74 KG/M2 | OXYGEN SATURATION: 97 % | WEIGHT: 238.6 LBS | HEIGHT: 64 IN | HEART RATE: 97 BPM

## 2024-04-25 DIAGNOSIS — I95.9 HYPOTENSION: ICD-10-CM

## 2024-04-25 DIAGNOSIS — E66.01 MORBID OBESITY (HCC): ICD-10-CM

## 2024-04-25 DIAGNOSIS — E78.5 DYSLIPIDEMIA: ICD-10-CM

## 2024-04-25 PROCEDURE — 99214 OFFICE O/P EST MOD 30 MIN: CPT | Performed by: FAMILY MEDICINE

## 2024-04-25 RX ORDER — TIRZEPATIDE 2.5 MG/.5ML
INJECTION, SOLUTION SUBCUTANEOUS
COMMUNITY
Start: 2024-04-22 | End: 2024-04-25

## 2024-04-25 RX ORDER — TIRZEPATIDE 5 MG/.5ML
5 INJECTION, SOLUTION SUBCUTANEOUS WEEKLY
Qty: 2 ML | Refills: 3 | Status: SHIPPED | OUTPATIENT
Start: 2024-04-25 | End: 2024-05-08 | Stop reason: SDUPTHER

## 2024-04-25 NOTE — PROGRESS NOTES
Assessment/Plan:  There are no diagnoses linked to this encounter.   1. BMI 40.0-44.9, adult (HCC)  tirzepatide (Zepbound) 5 mg/0.5 mL auto-injector      2. Morbid obesity (HCC)        3. Hypotension        4. Dyslipidemia          Advised do not stop statin therapy even though the levels are good  Increase Zepbound dosage to 5mg - she pays cash for it , prescription printed out  Encourage mindful eating, portion control, motivational interview performed to help patient reach goals   Encouraged exercise start 10 min daily goal 150 min weekly      Follow up in approximately 3 mo      Subjective:   Chief Complaint   Patient presents with    Follow-up     Waist: 46 inches     Patient here to discuss weight associated problems and nutrition goals  HPI: Yoselyn Benjamin  is a 48 y.o. female with excess weight/obesity here to pursue weight management.    Wt Readings from Last 10 Encounters:   04/25/24 108 kg (238 lb 9.6 oz)   02/22/24 110 kg (243 lb 6.4 oz)   01/09/24 110 kg (243 lb 9.6 oz)   08/29/23 111 kg (245 lb)   01/11/23 110 kg (243 lb)   01/04/23 106 kg (234 lb)   11/07/22 109 kg (240 lb)   05/27/21 102 kg (225 lb 15.5 oz)       Initial weight:243lbs  Current weight:238lbs    Increased appetite/cravings:less hunger tried only one shot of Zepbound 2.5mg no side effects   B:does not eat  L:one bag of edamame or protein shake  D:[place]  Snacks:no  Hydration:water  The following portions of the patient's history were reviewed and updated as appropriate: allergies, current medications, past family history, past medical history, past social history, past surgical history, and problem list.      Review of Systems   Constitutional: Negative for activity change. Fatigue  HENT: Negative for trouble swallowing.    Respiratory: Negative for shortness of breath.    Cardiovascular: Negative for chest pain, edema  Gastrointestinal: Negative for abdominal pain, nausea and vomiting, acid reflux,  "constipation/diarrhea  Psychiatric/Behavioral: Negative for behavioral problems , anxiety or depression    Objective:  /66 (BP Location: Left arm, Patient Position: Sitting, Cuff Size: Adult)   Pulse 97   Resp 18   Ht 5' 3.5\" (1.613 m)   Wt 108 kg (238 lb 9.6 oz)   SpO2 97%   BMI 41.60 kg/m²   Constitutional: Well-developed, well-nourished and Obese Body mass index is 41.6 kg/m²..  HEENT: No conjunctival pallor or jaundice.  Pulmonary: No increased work of breathing or signs of respiratory distress.  CV: well perfused, no edema  GI: Obese. Non-distended   Neuro: Oriented to person, place and time. Normal Speech. Normal gait.  Psych: Normal affect and mood. Normal thought process no delusions   Labs and Imaging  Recent labs and imaging have been personally reviewed.  Lab Results   Component Value Date    WBC 7.25 12/02/2023    HGB 12.8 12/02/2023    HCT 39.1 12/02/2023    MCV 80 (L) 12/02/2023     12/02/2023     Lab Results   Component Value Date    SODIUM 136 02/27/2024    K 4.4 02/27/2024     02/27/2024    CO2 26 02/27/2024    AGAP 9 02/27/2024    BUN 9 02/27/2024    CREATININE 0.85 02/27/2024    GLUC 91 02/27/2024    CALCIUM 9.4 02/27/2024    AST 16 02/27/2024    ALT 14 02/27/2024    ALKPHOS 46 02/27/2024    TP 6.9 02/27/2024    TBILI 0.5 02/27/2024    EGFR 85 02/27/2024     Lab Results   Component Value Date    HGBA1C 5.5 01/11/2023     Lab Results   Component Value Date    WXL4JGAAGJJL 1.537 12/02/2023    TSH 2.53 11/18/2022     "

## 2024-05-01 ENCOUNTER — HOSPITAL ENCOUNTER (OUTPATIENT)
Dept: SLEEP CENTER | Facility: CLINIC | Age: 49
Discharge: HOME | End: 2024-05-01
Payer: COMMERCIAL

## 2024-05-01 DIAGNOSIS — G47.19 EXCESSIVE DAYTIME SLEEPINESS: ICD-10-CM

## 2024-05-01 PROCEDURE — G0399 HOME SLEEP TEST/TYPE 3 PORTA: HCPCS

## 2024-05-01 NOTE — PROGRESS NOTES
Home Sleep Study Documentation    HOME STUDY DEVICE: Noxturnal no                                           Maisha G3 yes device # 21      Pre-Sleep Home Study:    Set-up and instructions performed by: Catrina    Technician performed demonstration for Patient: yes    Return demonstration performed by Patient: yes    Written instructions provided to Patient: yes    Patient signed consent form: yes        Post-Sleep Home Study:    Additional comments by Patient: None    Home Sleep Study Failed:no:    Failure reason: N/A    Reported or Detected: N/A    Scored by: ALY Perkins

## 2024-05-06 DIAGNOSIS — G47.33 OSA (OBSTRUCTIVE SLEEP APNEA): Primary | ICD-10-CM

## 2024-05-06 PROCEDURE — 95806 SLEEP STUDY UNATT&RESP EFFT: CPT | Performed by: INTERNAL MEDICINE

## 2024-05-08 ENCOUNTER — TELEPHONE (OUTPATIENT)
Dept: BARIATRICS | Facility: CLINIC | Age: 49
End: 2024-05-08

## 2024-05-08 RX ORDER — TIRZEPATIDE 5 MG/.5ML
5 INJECTION, SOLUTION SUBCUTANEOUS WEEKLY
Qty: 6 ML | Refills: 0 | Status: SHIPPED | OUTPATIENT
Start: 2024-05-08

## 2024-05-08 NOTE — TELEPHONE ENCOUNTER
Rx sent to [facility]  ----- Message from Mirlande Hawk sent at 5/8/2024  7:05 AM EDT -----  Regarding: FW: Zepbound   Contact: [phone]    ----- Message -----  From: Yoselyn Benjamin  Sent: 5/7/2024   8:50 PM EDT  To: [facility]  Subject: Zepbound                                         Good evening, Mrs. De Paz, I hope this message finds you well. I wanted to express my gratitude for your assistance during our recent visit. Your help has been invaluable.    Since finding the first Zepbound script and using it, I haven't been able to locate it again. I contacted [facility] directly, and they said the medicine is available, but I think pharmacies have no idea when it will be delivered. However, a rep at [facility] said I could ask my Dr to send my prescription to [facility] and get my obesity medication delivered directly to me. Is this something you could do?     I look forward to hearing from you.     Kindest regards,   Yoselyn

## 2024-05-14 ENCOUNTER — TELEPHONE (OUTPATIENT)
Dept: BARIATRICS | Facility: CLINIC | Age: 49
End: 2024-05-14

## 2024-05-14 DIAGNOSIS — E66.01 OBESITY, CLASS III, BMI 40-49.9 (MORBID OBESITY) (HCC): Primary | ICD-10-CM

## 2024-05-14 RX ORDER — TIRZEPATIDE 2.5 MG/.5ML
2.5 INJECTION, SOLUTION SUBCUTANEOUS WEEKLY
Qty: 2 ML | Refills: 1 | Status: SHIPPED | OUTPATIENT
Start: 2024-05-14

## 2024-05-14 NOTE — TELEPHONE ENCOUNTER
----- Message from Robel Dukes sent at 5/14/2024  4:30 PM EDT -----  Regarding: Zepbound   Contact: [phone]  Good Day,    Please see message from patient.     Thank you.      ----- Message -----  From: Yoselyn Benjamin  Sent: 5/14/2024   4:27 PM EDT  To: [facility]  Subject: Sea De Paz, Sorry to bother you again but I've had no luck filling my prescription for 5mg and I took my last shot today. However, [facility] in [place] just confirmed  they have 2.5mg available. Can I stay on 2.5 until 5mg until 5mg is available? If so, can someone from your care team send the script to [facility]. The pharmacist said it's available now but she can't guarantee it will be there long.     Address: [address], [place] [address]  Open  Closes 10PM  Phone: [phone]

## 2024-06-04 ENCOUNTER — TELEPHONE (OUTPATIENT)
Dept: SLEEP CENTER | Facility: CLINIC | Age: 49
End: 2024-06-04

## 2024-06-04 NOTE — TELEPHONE ENCOUNTER
Home sleep study resulted, confirms diagnosis of moderate ARJUN with respiratory event related hypoxemia.  CPAP ordered.    Call placed to patient, left call back message.    Patient scheduled for follow up with Dr. Lizarraga 6/10/2024.    "[employer] message sent to patient providing results, recommendations and instructions.

## 2024-06-13 ENCOUNTER — TELEPHONE (OUTPATIENT)
Age: 49
End: 2024-06-13

## 2024-06-18 LAB

## 2024-06-21 ENCOUNTER — TELEPHONE (OUTPATIENT)
Age: 49
End: 2024-06-21

## 2024-06-21 NOTE — TELEPHONE ENCOUNTER
PA for Zepbound EXCLUDED from plan       Reason:(Screenshot if applicable)        Message sent to office clinical pool Yes

## 2024-06-21 NOTE — TELEPHONE ENCOUNTER
PA for Zepbound 5mg    Submitted via    [x]CMM-KEY: [id number]  [][employer] Pocket-Case ID #   []Faxed to plan   []Other website   []Phone call Case ID #     Office notes sent, clinical questions answered. Awaiting determination    Turnaround time for your insurance to make a decision on your Prior Authorization can take 7-21 business days.

## 2024-06-24 LAB
DME PARACHUTE DELIVERY DATE ACTUAL: NORMAL
DME PARACHUTE DELIVERY DATE EXPECTED: NORMAL
DME PARACHUTE DELIVERY DATE REQUESTED: NORMAL
DME PARACHUTE ITEM DESCRIPTION: NORMAL
DME PARACHUTE ORDER STATUS: NORMAL
DME PARACHUTE SUPPLIER NAME: NORMAL
DME PARACHUTE SUPPLIER PHONE: NORMAL

## 2024-07-02 ENCOUNTER — TELEPHONE (OUTPATIENT)
Dept: HEMATOLOGY ONCOLOGY | Facility: CLINIC | Age: 49
End: 2024-07-02

## 2024-07-02 DIAGNOSIS — D50.0 IRON DEFICIENCY ANEMIA DUE TO CHRONIC BLOOD LOSS: Primary | ICD-10-CM

## 2024-07-02 NOTE — TELEPHONE ENCOUNTER
Call out to patient, appointment made for 7/5  Patient needs updated labs prior to follow up with Haleigh.   Lab orders placed.   Patient agreeable to have labs completed

## 2024-07-03 ENCOUNTER — APPOINTMENT (OUTPATIENT)
Dept: LAB | Facility: HOSPITAL | Age: 49
End: 2024-07-03
Payer: COMMERCIAL

## 2024-07-03 DIAGNOSIS — D50.0 IRON DEFICIENCY ANEMIA DUE TO CHRONIC BLOOD LOSS: ICD-10-CM

## 2024-07-05 ENCOUNTER — TELEPHONE (OUTPATIENT)
Dept: HEMATOLOGY ONCOLOGY | Facility: CLINIC | Age: 49
End: 2024-07-05

## 2024-07-05 ENCOUNTER — OFFICE VISIT (OUTPATIENT)
Dept: HEMATOLOGY ONCOLOGY | Facility: CLINIC | Age: 49
End: 2024-07-05
Payer: COMMERCIAL

## 2024-07-05 VITALS
WEIGHT: 228 LBS | RESPIRATION RATE: 15 BRPM | OXYGEN SATURATION: 100 % | HEART RATE: 77 BPM | SYSTOLIC BLOOD PRESSURE: 116 MMHG | TEMPERATURE: 96.1 F | HEIGHT: 64 IN | DIASTOLIC BLOOD PRESSURE: 62 MMHG | BODY MASS INDEX: 38.93 KG/M2

## 2024-07-05 DIAGNOSIS — Z86.711 HISTORY OF PULMONARY EMBOLISM: ICD-10-CM

## 2024-07-05 DIAGNOSIS — D50.0 IRON DEFICIENCY ANEMIA DUE TO CHRONIC BLOOD LOSS: Primary | ICD-10-CM

## 2024-07-05 PROCEDURE — 99214 OFFICE O/P EST MOD 30 MIN: CPT | Performed by: PHYSICIAN ASSISTANT

## 2024-07-05 RX ORDER — SODIUM CHLORIDE 9 MG/ML
20 INJECTION, SOLUTION INTRAVENOUS ONCE
OUTPATIENT
Start: 2024-07-19

## 2024-07-05 RX ORDER — RIVAROXABAN 20 MG/1
20 TABLET, FILM COATED ORAL
Qty: 90 TABLET | Refills: 1 | Status: SHIPPED | OUTPATIENT
Start: 2024-07-05

## 2024-07-05 RX ORDER — ATORVASTATIN CALCIUM 10 MG/1
20 TABLET, FILM COATED ORAL DAILY
COMMUNITY
Start: 2024-06-28

## 2024-07-05 NOTE — PROGRESS NOTES
[facility] [place]  [address] [facility] [place] [address]  Hematology Ambulatory Follow-Up  Yoselyn Benjamin, 1975, [id number]  7/5/2024      Assessment and Plan   This is a 48-year-old female with a history of recurrent PE, frequent miscarriage, VIOLETTA who presents as follow up.      1. History of pulmonary embolism  She had hypercoagulable work-up 11/2022 & 1/2023 which showed negative testing specifically for antiphospholipid syndrome.  She is taking Xarelto 20 mg once daily without any issues.  Due to her recurrent PE history, occupation with working from home, she will continue lifelong AC with Xarelto. She denies bleeding other than vaginal bleeding. Has plan for travel out of the country.     - Xarelto 20 MG tablet; Take 1 tablet (20 mg total) by mouth daily with dinner  Dispense: 90 tablet; Refill: 1  - Patient understands while being on anticoagulation that should she be in a significant accident such as a car accident and/or hit her head she should report to the ED to rule out fatal bleed.  She understands as well that it is rare to have a blood clot on top of anticoagulation.  However, should she develop significant chest pain, shortness of breath, leg swelling once again she should report to the ED.    - compression stocking, calf raises, frequent ambulation recommend while traveling   - discussed importance of xarelto compliance      2. Iron deficiency anemia due to chronic blood loss  3. Menorrhagia with regular cycle  Hx of fibroids. Still has menorrhagia. She has received IV venofer prn in the past. Most recent labs show hgb 12, ferritin 11. She complains of memory and concentration difficulty, fatigue. Has failed oral iron in the past.   - IV Venofer 300mg weekly x3  - She can take oral iron EOD with Vitamin C after IV iron has completed.  - CBC and differential; Future  - Iron Panel (Includes Ferritin, Iron Sat%, Iron, and TIBC);  Future    RTC 3m     Patient voiced agreement and understanding to the above.   Patient advised to call the Hematology/Oncology office with any questions and concerns regarding the above.    Barrier(s) to care: None  The patient is able to self care.    Haleigh Rodriguez PA-C   Medical Oncology/Hematology  [facility]    Subjective     Chief Complaint   Patient presents with    Follow-up       History of present illness: 48-year-old female with past medical history of PE, VIOLETTA who presents as follow    Patient previously followed by Linda Covarrubias PA-C.  History as below: Up.    1. Pulmonary Embolism, bilateral  2. History of multiple miscarriages   - 2017: Patient had bilateral PE with possible heart strain. Diagnosed in [place]. Was placed on Xarelto 20mg once daily for 7 months. Then taken off due to menstrual cycle increased.     - 2020/2021: Patient had bilateral PE again. Diagnosed in [place]. Placed on Xarelto again.  - Patient had history of 4 miscarriages. All in first trimester. Last pregnancy was in 2020/2021.  - Possible provoking factor for 2017 is long travel in car sometimes for entire days on weekend with . Patient currently working from home sitting for long periods of time. Currently on lifelong AC due to recurrent PE + occupational history.      - 11/2022: theombosis panel including factor V Leiden mutation, lupus anticoagulant, protein S total antigen, protein S functional testing, protein C functional testing, factor II, beta-2 glycoprotein antibodies, Antithrombin III activity, anticardiolipin antibody all of which were unrevealing. Only testing not done was prothrombin gene mutation.     3. VIOLETTA likely secondary to Menorrhagia  - patient recently diagnosed with fibroids  - 1/4/23: WBC 11.27, Hgb 10.7, MCV 75, PLT 3131K, ANC 8.54, rest of diff acceptable. No iron panel.   - 1-2/2023: S/P Venofer 300mg x 4 since Feraheme was not approved by insurance   - 8/2023: WBC 9.0, Hgb  "11.8, MCV 81, MCH 26.2, MCHC 32.5, PLT 308K, diff normal. Ferritin 11, iron 72, TIBC 351, iron sat 21%.  - 2024: Hemoglobin 12.3, MCV 79, platelets and WBC are normal.  Iron saturation 28%, iron 89, UIBC normal, ferritin 11.    24 :  Lab Results   Component Value Date    IRON 89 2024    TIBC 317 2024    FERRITIN 11 2024     Lab Results   Component Value Date    WBC 7.71 2024    HGB 12.3 2024    HCT 37.9 2024    MCV 79 (L) 2024     2024       Interval history: Monthly menses. Changes feminine product every 1hr on heaviest day and bleeding lasts 7 days. No hematochezia, melana or hematuria. +fatigue, difficulty memory and concentrations. +palpitations. Occasional chest \"spasms.\" She has upcoming travel to [place]. Has missed couple doses of xarelto on occasion     Review of Systems   Constitutional:  Positive for fatigue. Negative for fever and unexpected weight change.   Respiratory:  Negative for shortness of breath.    Cardiovascular:  Positive for palpitations. Negative for chest pain.   Gastrointestinal:  Negative for blood in stool.   Genitourinary:  Negative for hematuria.   Skin:         No breast changes or lumps   Neurological:  Positive for light-headedness. Negative for headaches.   Hematological:  Negative for adenopathy. Bruises/bleeds easily.   All other systems reviewed and are negative.      Patient Active Problem List   Diagnosis    History of pulmonary embolism    Encounter for preconception consultation    Family history of sickle cell trait    Mild intermittent asthma    BMI 40.0-44.9, adult ([facility])    Infertility, female    History of 2  sections    History of  death    Iron deficiency anemia due to chronic blood loss    Anxiety    ARJUN (obstructive sleep apnea)     Past Medical History:   Diagnosis Date    Asthma     history, no symptoms in years, inhaler (\"starts with an A\") is     Pulmonary embolism ([facility]) "     2017, bilateral, put on Xarelto, took with NSAIDs and had adnexal hemorrhage complication; recurred in 2019 now is on lifelong Xarelto, managed by pulmonologist     Past Surgical History:   Procedure Laterality Date    APPENDECTOMY       SECTION      x2 (, and in , with intervening TOLAC)    COLONOSCOPY      benign polyp removed    KNEE SURGERY Right     SHOULDER SURGERY Right      Family History   Problem Relation Age of Onset    Asthma Brother     Asthma Son      Social History     Socioeconomic History    Marital status: /Civil Union     Spouse name: None    Number of children: None    Years of education: None    Highest education level: None   Occupational History    None   Tobacco Use    Smoking status: Never    Smokeless tobacco: Never   Vaping Use    Vaping status: Never Used   Substance and Sexual Activity    Alcohol use: Yes     Alcohol/week: 5.0 standard drinks of alcohol     Types: 5 Glasses of wine per week    Drug use: Never    Sexual activity: Yes     Partners: Male     Birth control/protection: Abstinence   Other Topics Concern    None   Social History Narrative    None     Social Determinants of Health     Financial Resource Strain: Not on file   Food Insecurity: Not on file   Transportation Needs: Not on file   Physical Activity: Not on file   Stress: Not on file   Social Connections: Not on file   Intimate Partner Violence: Not on file   Housing Stability: Not on file       Current Outpatient Medications:     albuterol (ProAir HFA) 90 mcg/act inhaler, Inhale 2 puffs every 6 (six) hours as needed for wheezing, Disp: 25.5 g, Rfl: 3    atorvastatin (LIPITOR) 10 mg tablet, Take 20 mg by mouth daily Per patient 20mg, Disp: , Rfl:     atorvastatin (LIPITOR) 20 mg tablet, Take 20 mg by mouth daily, Disp: , Rfl:     multivitamin (THERAGRAN) TABS, Take 1 tablet by mouth daily, Disp: , Rfl:     Omega-3-acid Ethyl Esters & D3 1 & 1000 GM & UNIT KIT, Take 2 g by mouth daily, Disp: ,  "Rfl:     tirzepatide (Zepbound) 2.5 mg/0.5 mL auto-injector, Inject 0.5 mL (2.5 mg total) under the skin once a week, Disp: 2 mL, Rfl: 1    tirzepatide (Zepbound) 5 mg/0.5 mL auto-injector, Inject 0.5 mL (5 mg total) under the skin once a week, Disp: 6 mL, Rfl: 0    Xarelto 20 MG tablet, Take 1 tablet (20 mg total) by mouth daily with dinner, Disp: 90 tablet, Rfl: 1    cloNIDine (CATAPRES) 0.1 mg tablet, TAKE 1 TABLET (0.1 MG) BY ORAL ROUTE 3 TIMES PER DAY AS NEEDED FOR ANXIETY AND IRRITABILITY (Patient not taking: Reported on 2/22/2024), Disp: , Rfl:     folic acid (FOLVITE) 800 MCG tablet, Take 1 tablet (800 mcg total) by mouth daily (Patient not taking: Reported on 8/29/2023), Disp: 90 tablet, Rfl: 5    ondansetron (ZOFRAN-ODT) 4 mg disintegrating tablet, Take 1 tablet (4 mg total) by mouth every 6 (six) hours as needed for nausea or vomiting (Patient not taking: Reported on 8/29/2023), Disp: 20 tablet, Rfl: 0  Allergies   Allergen Reactions    Pollen Extract Sneezing       Objective   /62 (BP Location: Left arm, Patient Position: Sitting, Cuff Size: Standard)   Pulse 77   Temp (!) 96.1 °F (35.6 °C) (Temporal)   Resp 15   Ht 5' 3.5\" (1.613 m)   Wt 103 kg (228 lb)   SpO2 100%   BMI 39.75 kg/m²    Physical Exam  Vitals reviewed.   HENT:      Head: Normocephalic.   Cardiovascular:      Rate and Rhythm: Normal rate and regular rhythm.      Heart sounds: Normal heart sounds.   Pulmonary:      Effort: Pulmonary effort is normal.      Breath sounds: Normal breath sounds.   Abdominal:      Palpations: Abdomen is soft.      Tenderness: There is no abdominal tenderness.   Musculoskeletal:      Cervical back: Neck supple.   Lymphadenopathy:      Cervical: No cervical adenopathy.   Skin:     Findings: No rash.   Neurological:      Mental Status: She is alert.         Result Review  Labs:  Telephone on 06/13/2024   Component Date Value Ref Range Status    Supplier Name 06/13/2024 [employer] " [employer]   Final-Edited    Supplier Phone Number 06/13/2024 [phone]   Final-Edited    Order Status 06/13/2024 Delivery Successful   Final-Edited    Delivery Request Date 06/13/2024 06/13/2024   Final-Edited    Date Delivered  06/13/2024 06/24/2024   Final-Edited    Supplier Name 06/13/2024 06/24/2024   Final-Edited    Item Description 06/13/2024 CPAP Machine, Resmed   Final-Edited    Comment: Qty: 1  Auto Min Pressure: 5 cm  Auto Max Pressure: 15 cm  Oxygen Usage: None      Item Description 06/13/2024 PAP Mask, Nasal, Generic, Fit Upon Setup, 1 per 3 months   Final-Edited    Qty: 1    Item Description 06/13/2024 PAP Headgear, 1 per 6 months   Final-Edited    Qty: 1    Item Description 06/13/2024 PAP Humidifier, Heated   Final-Edited    Qty: 1    Item Description 06/13/2024 PAP Mask Interface Cushion, Nasal, 2 per 1 month   Final-Edited    Qty: 1    Item Description 06/13/2024 Disposable PAP Filter, 2 per 1 month   Final-Edited    Qty: 1    Item Description 06/13/2024 Non-Disposable PAP Filter, 1 per 6 months   Final-Edited    Qty: 1    Item Description 06/13/2024 PAP Machine Tubing, Heated, 1 per 3 months   Final-Edited    Qty: 1    Item Description 06/13/2024 Humidifier Water Chamber, 1 per 6 months   Final-Edited    Qty: 1    Item Description 06/13/2024 PAP Monitoring Modem   Final-Edited    Qty: 1       Imaging:   I reviewed relevant imaging    Please note:  This report has been generated by a voice recognition software system. Therefore there may be syntax, spelling, and/or grammatical errors. Please call if you have any questions.

## 2024-07-19 ENCOUNTER — HOSPITAL ENCOUNTER (OUTPATIENT)
Dept: INFUSION CENTER | Facility: CLINIC | Age: 49
End: 2024-07-19
Payer: COMMERCIAL

## 2024-07-19 VITALS
SYSTOLIC BLOOD PRESSURE: 115 MMHG | RESPIRATION RATE: 18 BRPM | TEMPERATURE: 98.5 F | DIASTOLIC BLOOD PRESSURE: 57 MMHG | HEART RATE: 88 BPM

## 2024-07-19 DIAGNOSIS — D50.0 IRON DEFICIENCY ANEMIA DUE TO CHRONIC BLOOD LOSS: Primary | ICD-10-CM

## 2024-07-19 PROCEDURE — 96365 THER/PROPH/DIAG IV INF INIT: CPT

## 2024-07-19 RX ORDER — SODIUM CHLORIDE 9 MG/ML
20 INJECTION, SOLUTION INTRAVENOUS ONCE
Status: COMPLETED | OUTPATIENT
Start: 2024-07-19 | End: 2024-07-19

## 2024-07-19 RX ORDER — SODIUM CHLORIDE 9 MG/ML
20 INJECTION, SOLUTION INTRAVENOUS ONCE
Status: CANCELLED | OUTPATIENT
Start: 2024-07-26

## 2024-07-19 RX ADMIN — SODIUM CHLORIDE 20 ML/HR: 0.9 INJECTION, SOLUTION INTRAVENOUS at 13:40

## 2024-07-19 RX ADMIN — IRON SUCROSE 300 MG: 20 INJECTION, SOLUTION INTRAVENOUS at 13:40

## 2024-07-19 NOTE — PROGRESS NOTES
Pt here venofer, offering no complaints.  Left AC IV placed with positive blood return noted.  Tolerated infusion without incident.  PIV removed.  AVS declined.  Aware of next appt 8/2 @ 330 pm.   Walked out in stable condition.

## 2024-07-29 ENCOUNTER — OFFICE VISIT (OUTPATIENT)
Dept: BARIATRICS | Facility: CLINIC | Age: 49
End: 2024-07-29
Payer: COMMERCIAL

## 2024-07-29 VITALS
HEIGHT: 64 IN | DIASTOLIC BLOOD PRESSURE: 72 MMHG | WEIGHT: 221.6 LBS | RESPIRATION RATE: 16 BRPM | BODY MASS INDEX: 37.83 KG/M2 | HEART RATE: 82 BPM | SYSTOLIC BLOOD PRESSURE: 118 MMHG

## 2024-07-29 DIAGNOSIS — E66.09 CLASS 2 OBESITY DUE TO EXCESS CALORIES IN ADULT: Primary | ICD-10-CM

## 2024-07-29 DIAGNOSIS — G47.33 OSA (OBSTRUCTIVE SLEEP APNEA): ICD-10-CM

## 2024-07-29 PROCEDURE — 99214 OFFICE O/P EST MOD 30 MIN: CPT | Performed by: FAMILY MEDICINE

## 2024-07-29 RX ORDER — TIRZEPATIDE 7.5 MG/.5ML
7.5 INJECTION, SOLUTION SUBCUTANEOUS WEEKLY
Qty: 2 ML | Refills: 0 | Status: SHIPPED | OUTPATIENT
Start: 2024-07-29 | End: 2024-08-26

## 2024-07-29 NOTE — PROGRESS NOTES
Assessment/Plan:  There are no diagnoses linked to this encounter.   1. Class 2 obesity due to excess calories in adult  tirzepatide (Zepbound) 7.5 mg/0.5 mL auto-injector      2. ARJUN (obstructive sleep apnea)  tirzepatide (Zepbound) 7.5 mg/0.5 mL auto-injector          Advised do not stop statin therapy even though the levels are good  Increase Zepbound dosage to 7.5mg - she pays cash for it , prescription printed out  Encourage mindful eating, portion control, motivational interview performed to help patient reach goals   Encouraged exercise start 10 min daily goal 150 min weekly      Follow up in approximately 3 mo      Subjective:   Chief Complaint   Patient presents with    Follow-up     Patient here to discuss weight associated problems and nutrition goals  HPI: Yoselyn Benjamin  is a 48 y.o. female with excess weight/obesity here to pursue weight management.    Wt Readings from Last 10 Encounters:   07/29/24 101 kg (221 lb 9.6 oz)   07/05/24 103 kg (228 lb)   04/25/24 108 kg (238 lb 9.6 oz)   02/22/24 110 kg (243 lb 6.4 oz)   01/09/24 110 kg (243 lb 9.6 oz)   08/29/23 111 kg (245 lb)   01/11/23 110 kg (243 lb)   01/04/23 106 kg (234 lb)   11/07/22 109 kg (240 lb)   05/27/21 102 kg (225 lb 15.5 oz)       Initial weight:243lbs  Last weight:238lbs  Current 221lbs  Lost 17lbs doing great no side effects       Increased appetite/cravings:less hunger tried only one shot of Zepbound 2.5mg no side effects   B:does not eat  L:one bag of edamame or protein shake  D:[place]  Snacks:no  Hydration:water  The following portions of the patient's history were reviewed and updated as appropriate: allergies, current medications, past family history, past medical history, past social history, past surgical history, and problem list.      Review of Systems   Constitutional: Negative for activity change. Fatigue  HENT: Negative for trouble swallowing.    Gastrointestinal: Negative for abdominal pain, nausea and vomiting, acid  "reflux, constipation/diarrhea  Psychiatric/Behavioral: Negative for behavioral problems , anxiety or depression    Objective:  /72 (BP Location: Left arm, Patient Position: Sitting, Cuff Size: Large)   Pulse 82   Resp 16   Ht 5' 3.5\" (1.613 m)   Wt 101 kg (221 lb 9.6 oz)   BMI 38.64 kg/m²   Constitutional: Well-developed, well-nourished and Obese Body mass index is 38.64 kg/m²..  HEENT: No conjunctival pallor or jaundice.  Pulmonary: No increased work of breathing or signs of respiratory distress.  CV: well perfused, no edema  GI: Obese. Non-distended   Neuro: Oriented to person, place and time. Normal Speech. Normal gait.  Psych: Normal affect and mood. Normal thought process no delusions   Labs and Imaging  Recent labs and imaging have been personally reviewed.  Lab Results   Component Value Date    WBC 7.71 07/03/2024    HGB 12.3 07/03/2024    HCT 37.9 07/03/2024    MCV 79 (L) 07/03/2024     07/03/2024     Lab Results   Component Value Date    SODIUM 138 07/12/2024    K 4.4 07/12/2024     07/12/2024    CO2 27 07/12/2024    AGAP 7 07/12/2024    BUN 9 07/12/2024    CREATININE 0.93 07/12/2024    GLUC 90 07/12/2024    CALCIUM 8.6 07/12/2024    AST 15 07/12/2024    ALT 13 07/12/2024    ALKPHOS 44 07/12/2024    TP 6.3 07/12/2024    TBILI 0.3 07/12/2024    EGFR 76 07/12/2024     Lab Results   Component Value Date    HGBA1C 5.3 07/12/2024     Lab Results   Component Value Date    SXH9CTFXTXXI 1.537 12/02/2023    TSH 2.53 11/18/2022     "

## 2024-07-31 DIAGNOSIS — D50.0 IRON DEFICIENCY ANEMIA DUE TO CHRONIC BLOOD LOSS: Primary | ICD-10-CM

## 2024-07-31 RX ORDER — SODIUM CHLORIDE 9 MG/ML
20 INJECTION, SOLUTION INTRAVENOUS ONCE
Status: CANCELLED | OUTPATIENT
Start: 2024-08-02

## 2024-08-02 ENCOUNTER — HOSPITAL ENCOUNTER (OUTPATIENT)
Dept: INFUSION CENTER | Facility: CLINIC | Age: 49
End: 2024-08-02
Payer: COMMERCIAL

## 2024-08-02 VITALS
DIASTOLIC BLOOD PRESSURE: 62 MMHG | HEART RATE: 78 BPM | SYSTOLIC BLOOD PRESSURE: 116 MMHG | TEMPERATURE: 97.5 F | RESPIRATION RATE: 17 BRPM

## 2024-08-02 DIAGNOSIS — D50.0 IRON DEFICIENCY ANEMIA DUE TO CHRONIC BLOOD LOSS: Primary | ICD-10-CM

## 2024-08-02 PROCEDURE — 96365 THER/PROPH/DIAG IV INF INIT: CPT

## 2024-08-02 RX ORDER — SODIUM CHLORIDE 9 MG/ML
20 INJECTION, SOLUTION INTRAVENOUS ONCE
OUTPATIENT
Start: 2024-08-09

## 2024-08-02 RX ORDER — SODIUM CHLORIDE 9 MG/ML
20 INJECTION, SOLUTION INTRAVENOUS ONCE
Status: COMPLETED | OUTPATIENT
Start: 2024-08-02 | End: 2024-08-02

## 2024-08-02 RX ADMIN — IRON SUCROSE 300 MG: 20 INJECTION, SOLUTION INTRAVENOUS at 15:49

## 2024-08-02 RX ADMIN — SODIUM CHLORIDE 20 ML/HR: 9 INJECTION, SOLUTION INTRAVENOUS at 15:49

## 2024-08-02 NOTE — PROGRESS NOTES
Pt presents for venofer infusion offering no complaints. Peripheral IV inserted positive blood return noted. Pt tolerating infusion so far, report given to Jasson Castellon RN. Next appointment on 8/9/24 at 3:30pm.

## 2024-08-02 NOTE — PROGRESS NOTES
Patient tolerated remainder of infusion without incident.  PIV removed.  Walked out in stable condition.

## 2024-08-08 ENCOUNTER — OFFICE VISIT (OUTPATIENT)
Age: 49
End: 2024-08-08
Payer: COMMERCIAL

## 2024-08-08 VITALS
OXYGEN SATURATION: 99 % | BODY MASS INDEX: 39.02 KG/M2 | SYSTOLIC BLOOD PRESSURE: 90 MMHG | HEIGHT: 63 IN | WEIGHT: 220.2 LBS | HEART RATE: 86 BPM | DIASTOLIC BLOOD PRESSURE: 60 MMHG

## 2024-08-08 DIAGNOSIS — G47.33 OSA (OBSTRUCTIVE SLEEP APNEA): Primary | ICD-10-CM

## 2024-08-08 DIAGNOSIS — E66.09 CLASS 2 OBESITY DUE TO EXCESS CALORIES WITHOUT SERIOUS COMORBIDITY WITH BODY MASS INDEX (BMI) OF 39.0 TO 39.9 IN ADULT: ICD-10-CM

## 2024-08-08 PROCEDURE — 99214 OFFICE O/P EST MOD 30 MIN: CPT | Performed by: INTERNAL MEDICINE

## 2024-08-08 NOTE — PROGRESS NOTES
Progress Note - Sleep Medicine  Yoselyn Benjamin 49 y.o. female MRN: [id number]       Impression & Plan:           1.  Moderate obstructive sleep apnea  HST BREN 17.8, oxygen zakiya 71%, less than 90% for 27 minutes at 243 pounds  Initial S/s: Snoring, choking, gagging, snorting, witnessed apneas, excessive daytime sleepiness, Sherwood score: 16    Compliance from 6/3 - 7/2  More than 4 hours 0%  Average usage 41 minutes  On APAP 5-15  95 percentile pressure 6.2  Median leak 11.6  Residual AHI 0.7    Poor compliance and patient has not used it in the last month    Trouble with mask fit and dealing with pressure     Plan  Ordered mask fitting for FFM   follow-up in 2 months    2.  Obesity  Counseled patient on lifestyle modifications including diet and exercise  Started on Zepbound  She is currently to 20 pounds and has lost 23 pounds since sleep study  Follow-up in 2 months and if she has continued to lose weight we will consider repeat sleep study    3.  Enlarged tonsils  Left tonsil more enlarged than right tonsil  Will consider ENT referral at next visit if still present  Patient states that has been present for 2 weeks, denies tenderness or exudate        ______________________________________________________________________    HPI:    Yoselyn Benjamin  is a 49 y.o. female with PMHx pulmonary embolism, asthma, morbid obesity who presents for follow-up of moderate obstructive sleep apnea.  HST BREN 17.8.        She initially reported loud snoring and excessive daytime sleepiness.  Sherwood score 16.  She feels her memory and concentration is worsened.      She was referred by bariatrics and previously was proceeding with gastric sleeve surgery.  However prior to the surgery insurance denied the procedure.  She was started on zepbound but felt the cost was too high.  She wakes up at night to urinate twice.  She goes to bed at 10 PM-12 AM.  She wakes up at 6 AM.  She is averaging 5 to 6 hours of sleep per night.  She  "does not take naps.      She drinks 8 ounces of coffee daily and occasionally drinks wine.      She has poor compliance with CPAP.  She states that nasal mask was a problem and she would prefer a full facemask.      Review of Systems:  Review of Systems   All other systems reviewed and are negative.        Social history updates:  Social History     Tobacco Use   Smoking Status Never   Smokeless Tobacco Never     Social History     Socioeconomic History    Marital status: /Civil Union     Spouse name: Not on file    Number of children: Not on file    Years of education: Not on file    Highest education level: Not on file   Occupational History    Not on file   Tobacco Use    Smoking status: Never    Smokeless tobacco: Never   Vaping Use    Vaping status: Never Used   Substance and Sexual Activity    Alcohol use: Yes     Alcohol/week: 5.0 standard drinks of alcohol     Types: 5 Glasses of wine per week    Drug use: Never    Sexual activity: Yes     Partners: Male     Birth control/protection: Abstinence   Other Topics Concern    Not on file   Social History Narrative    Not on file     Social Determinants of Health     Financial Resource Strain: Not on file   Food Insecurity: Not on file   Transportation Needs: Not on file   Physical Activity: Not on file   Stress: Not on file   Social Connections: Not on file   Intimate Partner Violence: Not on file   Housing Stability: Not on file       PhysicalExamination:  Vitals:   BP 90/60 (BP Location: Left arm, Patient Position: Sitting, Cuff Size: Large)   Pulse 86   Ht 5' 3\" (1.6 m)   Wt 99.9 kg (220 lb 3.2 oz)   SpO2 99%   BMI 39.01 kg/m²     Physical Exam  General:  Awake alert and oriented x 3, conversant without conversational dyspnea, NAD, normal affect  HEENT:  PERRL, Sclera noninjected, nonicteric OU, Nares patent,  no craniofacial abnormalities, Mucous membranes, moist, no oral lesions, normal dentition  NECK: Trachea midline, no accessory muscle use, no " stridor, no cervical or supraclavicular adenopathy, JVP not elevated  CARDIAC: Reg, single s1/S2, no m/r/g  PULM: CTA bilaterally no wheezing, rhonchi or rales  EXT: No cyanosis, no clubbing, no edema, normal capillary refill  NEURO: no focal neurologic deficits, AAOx3, moving all extremities appropriately     Diagnostic Data:  Labs:  I personally reviewed the most recent laboratory data pertinent to today's visit  Telephone on 06/13/2024   Component Date Value    Supplier Name 06/13/2024 [employer]     Supplier Phone Number 06/13/2024 [phone]     Order Status 06/13/2024 Delivery Successful     Delivery Request Date 06/13/2024 06/13/2024     Date Delivered  06/13/2024 06/24/2024     Supplier Name 06/13/2024 06/24/2024     Item Description 06/13/2024 CPAP Machine, Resmed     Item Description 06/13/2024 PAP Mask, Nasal, Generic, Fit Upon Setup, 1 per 3 months     Item Description 06/13/2024 PAP Headgear, 1 per 6 months     Item Description 06/13/2024 PAP Humidifier, Heated     Item Description 06/13/2024 PAP Mask Interface Cushion, Nasal, 2 per 1 month     Item Description 06/13/2024 Disposable PAP Filter, 2 per 1 month     Item Description 06/13/2024 Non-Disposable PAP Filter, 1 per 6 months     Item Description 06/13/2024 PAP Machine Tubing, Heated, 1 per 3 months     Item Description 06/13/2024 Humidifier Water Chamber, 1 per 6 months     Item Description 06/13/2024 PAP Monitoring Modem    Ancillary Orders on 02/09/2024   Component Date Value    WBC 07/03/2024 7.71     RBC 07/03/2024 4.80     Hemoglobin 07/03/2024 12.3     Hematocrit 07/03/2024 37.9     MCV 07/03/2024 79 (L)     MCH 07/03/2024 25.6 (L)     MCHC 07/03/2024 32.5     RDW 07/03/2024 16.1 (H)     MPV 07/03/2024 9.8     Platelets 07/03/2024 289     nRBC 07/03/2024 0     Segmented % 07/03/2024 56     Immature Grans % 07/03/2024 0     Lymphocytes % 07/03/2024 30     Monocytes % 07/03/2024 10     Eosinophils Relative  "07/03/2024 3     Basophils Relative 07/03/2024 1     Absolute Neutrophils 07/03/2024 4.37     Absolute Immature Grans 07/03/2024 0.01     Absolute Lymphocytes 07/03/2024 2.34     Absolute Monocytes 07/03/2024 0.73     Eosinophils Absolute 07/03/2024 0.19     Basophils Absolute 07/03/2024 0.07     Iron Saturation 07/03/2024 28     TIBC 07/03/2024 317     Iron 07/03/2024 89     UIBC 07/03/2024 228     Ferritin 07/03/2024 11        I have personally reviewed pertinent lab results.  Lab Results   Component Value Date    WBC 7.71 07/03/2024    HGB 12.3 07/03/2024    HCT 37.9 07/03/2024    MCV 79 (L) 07/03/2024     07/03/2024     Lab Results   Component Value Date    CALCIUM 8.6 07/12/2024    K 4.4 07/12/2024    CO2 27 07/12/2024     07/12/2024    BUN 9 07/12/2024    CREATININE 0.93 07/12/2024     No results found for: \"IGE\"  Lab Results   Component Value Date    ALT 13 07/12/2024    AST 15 07/12/2024    ALKPHOS 44 07/12/2024     Lab Results   Component Value Date    IRON 89 07/03/2024    TIBC 317 07/03/2024    FERRITIN 11 07/03/2024     Lab Results   Component Value Date    [id number] 685 04/01/2022     No results found for: \"FOLATE\"      Arterial Blood Gas result:  JUAN Lizarraga MD  [facility] Sleep Medicine    "

## 2024-08-09 ENCOUNTER — HOSPITAL ENCOUNTER (OUTPATIENT)
Dept: INFUSION CENTER | Facility: CLINIC | Age: 49
End: 2024-08-09
Payer: COMMERCIAL

## 2024-08-09 VITALS
HEART RATE: 80 BPM | DIASTOLIC BLOOD PRESSURE: 65 MMHG | TEMPERATURE: 98.9 F | SYSTOLIC BLOOD PRESSURE: 98 MMHG | RESPIRATION RATE: 18 BRPM

## 2024-08-09 DIAGNOSIS — D50.0 IRON DEFICIENCY ANEMIA DUE TO CHRONIC BLOOD LOSS: Primary | ICD-10-CM

## 2024-08-09 PROCEDURE — 96365 THER/PROPH/DIAG IV INF INIT: CPT

## 2024-08-09 RX ORDER — SODIUM CHLORIDE 9 MG/ML
20 INJECTION, SOLUTION INTRAVENOUS ONCE
Status: COMPLETED | OUTPATIENT
Start: 2024-08-09 | End: 2024-08-09

## 2024-08-09 RX ORDER — SODIUM CHLORIDE 9 MG/ML
20 INJECTION, SOLUTION INTRAVENOUS ONCE
Status: CANCELLED | OUTPATIENT
Start: 2024-08-09

## 2024-08-09 RX ADMIN — IRON SUCROSE 300 MG: 20 INJECTION, SOLUTION INTRAVENOUS at 15:12

## 2024-08-09 RX ADMIN — SODIUM CHLORIDE 20 ML/HR: 9 INJECTION, SOLUTION INTRAVENOUS at 15:09

## 2024-08-09 NOTE — PROGRESS NOTES
Pt presents for venofer, offers no complaints tolerating infusion, hand off report provided to FRIDA Ramirez

## 2024-08-23 DIAGNOSIS — E66.09 CLASS 2 OBESITY DUE TO EXCESS CALORIES IN ADULT: Primary | ICD-10-CM

## 2024-08-23 RX ORDER — TIRZEPATIDE 10 MG/.5ML
10 INJECTION, SOLUTION SUBCUTANEOUS WEEKLY
Qty: 2 ML | Refills: 1 | Status: SHIPPED | OUTPATIENT
Start: 2024-08-23 | End: 2024-10-18

## 2024-08-29 ENCOUNTER — TELEPHONE (OUTPATIENT)
Dept: OTHER | Facility: HOSPITAL | Age: 49
End: 2024-08-29

## 2024-08-29 NOTE — TELEPHONE ENCOUNTER
Patient would like to know if the doctor could send in a script for Zepbound 15 mg instead of the 10 mg. Her pharmacy is all out of the 10 mg, but they have the 15 mg. Please send to [id number] - [place] - [address] Please advice and contact patient with any issues.  [phone]

## 2024-08-30 DIAGNOSIS — E66.9 OBESITY, CLASS II, BMI 35-39.9: Primary | ICD-10-CM

## 2024-08-30 RX ORDER — TIRZEPATIDE 7.5 MG/.5ML
7.5 INJECTION, SOLUTION SUBCUTANEOUS WEEKLY
Qty: 2 ML | Refills: 0 | Status: SHIPPED | OUTPATIENT
Start: 2024-08-30 | End: 2024-10-25

## 2024-09-13 ENCOUNTER — APPOINTMENT (EMERGENCY)
Dept: RADIOLOGY | Facility: HOSPITAL | Age: 49
End: 2024-09-13
Payer: COMMERCIAL

## 2024-09-13 ENCOUNTER — HOSPITAL ENCOUNTER (EMERGENCY)
Facility: HOSPITAL | Age: 49
Discharge: HOME | End: 2024-09-13
Attending: EMERGENCY MEDICINE
Payer: COMMERCIAL

## 2024-09-13 VITALS
TEMPERATURE: 99.3 F | DIASTOLIC BLOOD PRESSURE: 66 MMHG | WEIGHT: 216.27 LBS | RESPIRATION RATE: 16 BRPM | HEIGHT: 62 IN | HEART RATE: 73 BPM | OXYGEN SATURATION: 100 % | SYSTOLIC BLOOD PRESSURE: 117 MMHG | BODY MASS INDEX: 39.8 KG/M2

## 2024-09-13 DIAGNOSIS — R00.2 PALPITATIONS: Primary | ICD-10-CM

## 2024-09-13 DIAGNOSIS — I49.3 SYMPTOMATIC PVCS: ICD-10-CM

## 2024-09-13 LAB
ALBUMIN SERPL BCG-MCNC: 4.2 G/DL (ref 3.5–5)
ALP SERPL-CCNC: 45 U/L (ref 34–104)
ALT SERPL W P-5'-P-CCNC: 16 U/L (ref 7–52)
ANION GAP SERPL CALCULATED.3IONS-SCNC: 6 MMOL/L (ref 4–13)
APTT PPP: 35 SECONDS (ref 23–34)
AST SERPL W P-5'-P-CCNC: 15 U/L (ref 13–39)
BASOPHILS # BLD AUTO: 0.06 THOUSANDS/ΜL (ref 0–0.1)
BASOPHILS NFR BLD AUTO: 1 % (ref 0–1)
BILIRUB DIRECT SERPL-MCNC: 0.1 MG/DL (ref 0–0.2)
BILIRUB SERPL-MCNC: 0.41 MG/DL (ref 0.2–1)
BUN SERPL-MCNC: 11 MG/DL (ref 5–25)
CALCIUM SERPL-MCNC: 9.3 MG/DL (ref 8.4–10.2)
CARDIAC TROPONIN I PNL SERPL HS: <2 NG/L
CHLORIDE SERPL-SCNC: 104 MMOL/L (ref 96–108)
CO2 SERPL-SCNC: 27 MMOL/L (ref 21–32)
CREAT SERPL-MCNC: 0.99 MG/DL (ref 0.6–1.3)
D DIMER PPP FEU-MCNC: <0.27 UG/ML FEU
EOSINOPHIL # BLD AUTO: 0.27 THOUSAND/ΜL (ref 0–0.61)
EOSINOPHIL NFR BLD AUTO: 3 % (ref 0–6)
ERYTHROCYTE [DISTWIDTH] IN BLOOD BY AUTOMATED COUNT: 17.8 % (ref 11.6–15.1)
GFR SERPL CREATININE-BSD FRML MDRD: 68 ML/MIN/1.73SQ M
GLUCOSE SERPL-MCNC: 86 MG/DL (ref 65–140)
HCG SERPL QL: NEGATIVE
HCT VFR BLD AUTO: 36.4 % (ref 34.8–46.1)
HGB BLD-MCNC: 11.7 G/DL (ref 11.5–15.4)
IMM GRANULOCYTES # BLD AUTO: 0.02 THOUSAND/UL (ref 0–0.2)
IMM GRANULOCYTES NFR BLD AUTO: 0 % (ref 0–2)
INR PPP: 2.32 (ref 0.85–1.19)
LYMPHOCYTES # BLD AUTO: 2.79 THOUSANDS/ΜL (ref 0.6–4.47)
LYMPHOCYTES NFR BLD AUTO: 35 % (ref 14–44)
MAGNESIUM SERPL-MCNC: 2.1 MG/DL (ref 1.9–2.7)
MCH RBC QN AUTO: 26.1 PG (ref 26.8–34.3)
MCHC RBC AUTO-ENTMCNC: 32.1 G/DL (ref 31.4–37.4)
MCV RBC AUTO: 81 FL (ref 82–98)
MONOCYTES # BLD AUTO: 0.76 THOUSAND/ΜL (ref 0.17–1.22)
MONOCYTES NFR BLD AUTO: 10 % (ref 4–12)
NEUTROPHILS # BLD AUTO: 4.08 THOUSANDS/ΜL (ref 1.85–7.62)
NEUTS SEG NFR BLD AUTO: 51 % (ref 43–75)
NRBC BLD AUTO-RTO: 0 /100 WBCS
PLATELET # BLD AUTO: 312 THOUSANDS/UL (ref 149–390)
PMV BLD AUTO: 11 FL (ref 8.9–12.7)
POTASSIUM SERPL-SCNC: 3.9 MMOL/L (ref 3.5–5.3)
PROT SERPL-MCNC: 7.2 G/DL (ref 6.4–8.4)
PROTHROMBIN TIME: 26.1 SECONDS (ref 12.3–15)
RBC # BLD AUTO: 4.49 MILLION/UL (ref 3.81–5.12)
SODIUM SERPL-SCNC: 137 MMOL/L (ref 135–147)
TSH SERPL DL<=0.05 MIU/L-ACNC: 2.93 UIU/ML (ref 0.45–4.5)
WBC # BLD AUTO: 7.98 THOUSAND/UL (ref 4.31–10.16)

## 2024-09-13 PROCEDURE — 80048 BASIC METABOLIC PNL TOTAL CA: CPT | Performed by: EMERGENCY MEDICINE

## 2024-09-13 PROCEDURE — 84443 ASSAY THYROID STIM HORMONE: CPT | Performed by: EMERGENCY MEDICINE

## 2024-09-13 PROCEDURE — 85025 COMPLETE CBC W/AUTO DIFF WBC: CPT | Performed by: EMERGENCY MEDICINE

## 2024-09-13 PROCEDURE — 85610 PROTHROMBIN TIME: CPT | Performed by: EMERGENCY MEDICINE

## 2024-09-13 PROCEDURE — 80076 HEPATIC FUNCTION PANEL: CPT | Performed by: EMERGENCY MEDICINE

## 2024-09-13 PROCEDURE — 96360 HYDRATION IV INFUSION INIT: CPT

## 2024-09-13 PROCEDURE — 85379 FIBRIN DEGRADATION QUANT: CPT | Performed by: EMERGENCY MEDICINE

## 2024-09-13 PROCEDURE — 84484 ASSAY OF TROPONIN QUANT: CPT | Performed by: EMERGENCY MEDICINE

## 2024-09-13 PROCEDURE — 85730 THROMBOPLASTIN TIME PARTIAL: CPT | Performed by: EMERGENCY MEDICINE

## 2024-09-13 PROCEDURE — 99285 EMERGENCY DEPT VISIT HI MDM: CPT

## 2024-09-13 PROCEDURE — 84703 CHORIONIC GONADOTROPIN ASSAY: CPT | Performed by: EMERGENCY MEDICINE

## 2024-09-13 PROCEDURE — 36415 COLL VENOUS BLD VENIPUNCTURE: CPT | Performed by: EMERGENCY MEDICINE

## 2024-09-13 PROCEDURE — 93005 ELECTROCARDIOGRAM TRACING: CPT

## 2024-09-13 PROCEDURE — 83735 ASSAY OF MAGNESIUM: CPT | Performed by: EMERGENCY MEDICINE

## 2024-09-13 PROCEDURE — 71045 X-RAY EXAM CHEST 1 VIEW: CPT

## 2024-09-13 PROCEDURE — 99285 EMERGENCY DEPT VISIT HI MDM: CPT | Performed by: EMERGENCY MEDICINE

## 2024-09-13 RX ADMIN — SODIUM CHLORIDE 1000 ML: 0.9 INJECTION, SOLUTION INTRAVENOUS at 21:13

## 2024-09-14 LAB
ATRIAL RATE: 73 BPM
P AXIS: 67 DEGREES
PR INTERVAL: 146 MS
QRS AXIS: 30 DEGREES
QRSD INTERVAL: 88 MS
QT INTERVAL: 402 MS
QTC INTERVAL: 442 MS
T WAVE AXIS: 47 DEGREES
VENTRICULAR RATE: 73 BPM

## 2024-09-14 PROCEDURE — 93010 ELECTROCARDIOGRAM REPORT: CPT | Performed by: INTERNAL MEDICINE

## 2024-09-14 NOTE — ED NOTES
"Pt states she had the \"fluttery feeling\" again.  RN reviewed monitor, and found a pvc.  Strip printed and given to provider.     Florecita Jean Baptiste RN  09/13/24 [id number]    "

## 2024-09-14 NOTE — ED PROVIDER NOTES
1. Palpitations    2. Symptomatic PVCs      ED Disposition       ED Disposition   Discharge    Condition   Stable    Date/Time   Fri Sep 13, 2024 10:31 PM    Comment   Yoselyn Benjamin discharge to home/self care.                   Assessment & Plan       Medical Decision Making  49-year-old female presents to the ED with intermittent palpitations, worsened over the past 2 days.  She describes it as fluttering in her chest and throat.  She denies any chest pain or pressure, dyspnea.  Patient does have significant history of PE x 2 and is currently on Xarelto.  Palpitations could be secondary to symptomatic PVCs, other arrhythmia, anxiety or stress, thyroid abnormality, electrolyte or metabolic abnormality, recurrent PE, likely ACS.  Will evaluate with cardiac labs, EKG, D-dimer, TSH.  Will provide IV fluids.    Amount and/or Complexity of Data Reviewed  Labs: ordered. Decision-making details documented in ED Course.  Radiology: ordered and independent interpretation performed. Decision-making details documented in ED Course.  ECG/medicine tests: ordered and independent interpretation performed. Decision-making details documented in ED Course.          HEART Risk Score      Flowsheet Row Most Recent Value   Heart Score Risk Calculator    History 0 Filed at: 09/13/2024 2144   ECG 0 Filed at: 09/13/2024 2144   Age 1 Filed at: 09/13/2024 2144   Risk Factors 1 Filed at: 09/13/2024 2144   Troponin 0 Filed at: 09/13/2024 2144   HEART Score 2 Filed at: 09/13/2024 2144               ED Course as of 09/13/24 2231   Fri Sep 13, 2024   2144 D-Dimer, Quant: <0.27   2144 hs TnI 0hr: <2   2154 Hemoglobin: 11.7   2225 Patient became symptomatic and nurse repeated EKG and there was a single PVC which correlated to when patient had her symptoms.  Other than that, EKG showed normal sinus rhythm without any other acute abnormality.  Suspect symptomatic PVCs.  Discussed essentially normal workup with patient and plan to refer to  cardiology for consideration of Holter monitoring.  Patient states that when the symptoms were started she actually made an appointment with a cardiologist and has an appointment in October.  I encouraged her to keep appointment unless she can get a sooner appointment for my referral.  Discussed ED return parameters.       Medications   sodium chloride 0.9 % bolus 1,000 mL (1,000 mL Intravenous New Bag 9/13/24 4482)       History of Present Illness       Patient is a 49-year-old female with past medical history of pulmonary embolism initially diagnosed in 2007 and recurrent PE in 2019 currently on Xarelto, presents to the emergency department for palpitations.  Patient states that she has had palpitations on and off for a while but over the past 2 days it is worsened.  She states she feels a fluttering in her chest and in her throat.  She does admit to being under increased stress and anxiety recently.  She denies any associated chest pain or dyspnea, diaphoresis, recent cough, hemoptysis, URI symptoms, fevers or chills.  She denies any abdominal pain, nausea, vomiting, change in bowel habits, urinary symptoms, skin rash or color change, leg pain or swelling, new focal neurologic deficits.  Patient reports she is compliant with her Xarelto but over the past 2 weeks she does admit to occasionally forgetting to take her dose but she always takes it the next day.      History provided by:  Patient   used: No    Chest Pain  Associated symptoms: palpitations    Associated symptoms: no abdominal pain, no back pain, no cough, no diaphoresis, no dizziness, no fever, no headache, no nausea, no numbness, no shortness of breath, not vomiting and no weakness            Review of Systems   Constitutional:  Negative for chills, diaphoresis and fever.   HENT:  Negative for congestion, ear pain, rhinorrhea and sore throat.    Respiratory:  Negative for cough, chest tightness, shortness of breath and wheezing.     Cardiovascular:  Positive for palpitations. Negative for chest pain and leg swelling.   Gastrointestinal:  Negative for abdominal distention, abdominal pain, blood in stool, constipation, diarrhea, nausea and vomiting.   Genitourinary:  Negative for dysuria, flank pain, frequency and hematuria.   Musculoskeletal:  Negative for back pain, neck pain and neck stiffness.   Skin:  Negative for color change, pallor, rash and wound.   Allergic/Immunologic: Negative for immunocompromised state.   Neurological:  Negative for dizziness, syncope, weakness, light-headedness, numbness and headaches.   Hematological:  Negative for adenopathy. Bruises/bleeds easily.   Psychiatric/Behavioral:  Negative for confusion and decreased concentration. The patient is nervous/anxious.    All other systems reviewed and are negative.          Objective     ED Triage Vitals [09/13/24 1956]   Temperature Pulse Blood Pressure Respirations SpO2 Patient Position - Orthostatic VS   99.3 °F (37.4 °C) 73 135/65 18 100 % Sitting      Temp Source Heart Rate Source BP Location FiO2 (%) Pain Score    Oral Monitor Left arm -- --      Vitals:    09/13/24 2033 09/13/24 2103 09/13/24 2133 09/13/24 2203   BP: 104/54 110/62 119/80 117/66   BP Location:       Pulse: 67 76 72 73   Resp: 16 12 12 16   Temp:       TempSrc:       SpO2: 97% 100%  100%   Weight:       Height:            Physical Exam  Vitals and nursing note reviewed.   Constitutional:       General: She is not in acute distress.     Appearance: Normal appearance. She is well-developed. She is not ill-appearing, toxic-appearing or diaphoretic.   HENT:      Head: Normocephalic and atraumatic.      Right Ear: External ear normal.      Left Ear: External ear normal.      Nose: Nose normal.      Mouth/Throat:      Mouth: Mucous membranes are moist.      Pharynx: Oropharynx is clear.   Eyes:      Extraocular Movements: Extraocular movements intact.      Conjunctiva/sclera: Conjunctivae normal.   Neck:       Vascular: No JVD.   Cardiovascular:      Rate and Rhythm: Normal rate and regular rhythm.      Pulses: Normal pulses.      Heart sounds: Normal heart sounds. No murmur heard.     No friction rub. No gallop.   Pulmonary:      Effort: Pulmonary effort is normal. No respiratory distress.      Breath sounds: Normal breath sounds. No wheezing, rhonchi or rales.   Chest:      Chest wall: No tenderness.   Abdominal:      General: There is no distension.      Palpations: Abdomen is soft.      Tenderness: There is no abdominal tenderness. There is no guarding or rebound.   Musculoskeletal:         General: No swelling or tenderness. Normal range of motion.      Cervical back: Normal range of motion and neck supple. No rigidity.      Right lower leg: No edema.      Left lower leg: No edema.   Skin:     General: Skin is warm and dry.      Coloration: Skin is not pale.      Findings: No erythema or rash.   Neurological:      General: No focal deficit present.      Mental Status: She is alert and oriented to person, place, and time.      Cranial Nerves: No cranial nerve deficit.      Sensory: No sensory deficit.      Motor: No weakness.   Psychiatric:         Mood and Affect: Mood normal.         Behavior: Behavior normal.         Labs Reviewed   CBC AND DIFFERENTIAL - Abnormal       Result Value    WBC 7.98      RBC 4.49      Hemoglobin 11.7      Hematocrit 36.4      MCV 81 (*)     MCH 26.1 (*)     MCHC 32.1      RDW 17.8 (*)     MPV 11.0      Platelets 312      nRBC 0      Segmented % 51      Immature Grans % 0      Lymphocytes % 35      Monocytes % 10      Eosinophils Relative 3      Basophils Relative 1      Absolute Neutrophils 4.08      Absolute Immature Grans 0.02      Absolute Lymphocytes 2.79      Absolute Monocytes 0.76      Eosinophils Absolute 0.27      Basophils Absolute 0.06     PROTIME-INR - Abnormal    Protime 26.1 (*)     INR 2.32 (*)     Narrative:     INR Therapeutic Range    Indication                                              INR Range      Atrial Fibrillation                                               2.0-3.0  Hypercoagulable State                                    2.0.2.3  Left Ventricular Asist Device                            2.0-3.0  Mechanical Heart Valve                                  -    Aortic(with afib, MI, embolism, HF, LA enlargement,    and/or coagulopathy)                                     2.0-3.0 (2.5-3.5)     Mitral                                                             2.5-3.5  Prosthetic/Bioprosthetic Heart Valve               2.0-3.0  Venous thromboembolism (VTE: VT, PE        2.0-3.0   APTT - Abnormal    PTT 35 (*)    HEPATIC FUNCTION PANEL - Normal    Total Bilirubin 0.41      Bilirubin, Direct 0.10      Alkaline Phosphatase 45      AST 15      ALT 16      Total Protein 7.2      Albumin 4.2     TSH, 3RD GENERATION WITH FREE T4 REFLEX - Normal    TSH 3RD GENERATON 2.930     MAGNESIUM - Normal    Magnesium 2.1     HS TROPONIN I 0HR - Normal    hs TnI 0hr <2     D-DIMER, QUANTITATIVE - Normal    D-Dimer, Quant <0.27     PREGNANCY TEST (HCG QUALITATIVE) - Normal    Preg, Serum Negative     BASIC METABOLIC PANEL    Sodium 137      Potassium 3.9      Chloride 104      CO2 27      ANION GAP 6      BUN 11      Creatinine 0.99      Glucose 86      Calcium 9.3      eGFR 68      Narrative:     [employer] guidelines for Chronic Kidney Disease (CKD):     Stage 1 with normal or high GFR (GFR > 90 mL/min/1.73 square meters)    Stage 2 Mild CKD (GFR = 60-89 mL/min/1.73 square meters)    Stage 3A Moderate CKD (GFR = 45-59 mL/min/1.73 square meters)    Stage 3B Moderate CKD (GFR = 30-44 mL/min/1.73 square meters)    Stage 4 Severe CKD (GFR = 15-29 mL/min/1.73 square meters)    Stage 5 End Stage CKD (GFR <15 mL/min/1.73 square meters)  Note: GFR calculation is accurate only with a steady state creatinine     XR chest 1 view portable   ED Interpretation by Symone Young  DO [place] (09/13 2144)   No acute abnormality in the chest.          ECG 12 Lead Documentation Only    Date/Time: 9/13/2024 8:05 PM    Performed by: Symone Figueroa DO  Authorized by: Symone Figueroa DO    ECG reviewed by me, the ED Provider: yes    Patient location:  ED  Previous ECG:     Previous ECG:  Compared to current    Comparison ECG info:  1-4-2023    Similarity:  No change  Rate:     ECG rate:  73    ECG rate assessment: normal    Rhythm:     Rhythm: sinus rhythm      Rhythm comment:  With sinus arrhythmia  Ectopy:     Ectopy: none    QRS:     QRS axis:  Normal    QRS intervals:  Normal  Conduction:     Conduction: normal    ST segments:     ST segments:  Normal  T waves:     T waves: normal    Comments:      Low voltage QRS complex.         Symone Figueroa DO  09/13/24 [id number]

## 2024-09-25 DIAGNOSIS — E66.812 OBESITY, CLASS II, BMI 35-39.9: ICD-10-CM

## 2024-09-25 DIAGNOSIS — E66.9 OBESITY, CLASS II, BMI 35-39.9: ICD-10-CM

## 2024-09-25 RX ORDER — TIRZEPATIDE 7.5 MG/.5ML
7.5 INJECTION, SOLUTION SUBCUTANEOUS WEEKLY
Qty: 2 ML | Refills: 0 | Status: SHIPPED | OUTPATIENT
Start: 2024-09-25 | End: 2024-11-20

## 2024-10-01 ENCOUNTER — TELEPHONE (OUTPATIENT)
Dept: HEMATOLOGY ONCOLOGY | Facility: CLINIC | Age: 49
End: 2024-10-01

## 2024-10-02 DIAGNOSIS — E66.812 OBESITY, CLASS II, BMI 35-39.9: ICD-10-CM

## 2024-10-02 RX ORDER — TIRZEPATIDE 7.5 MG/.5ML
7.5 INJECTION, SOLUTION SUBCUTANEOUS WEEKLY
Qty: 6 ML | Refills: 0 | Status: SHIPPED | OUTPATIENT
Start: 2024-10-02 | End: 2024-12-31

## 2024-10-03 ENCOUNTER — APPOINTMENT (OUTPATIENT)
Dept: LAB | Facility: HOSPITAL | Age: 49
End: 2024-10-03
Payer: COMMERCIAL

## 2024-10-03 DIAGNOSIS — D50.0 IRON DEFICIENCY ANEMIA DUE TO CHRONIC BLOOD LOSS: ICD-10-CM

## 2024-10-03 LAB
BASOPHILS # BLD AUTO: 0.06 THOUSANDS/ΜL (ref 0–0.1)
BASOPHILS NFR BLD AUTO: 1 % (ref 0–1)
EOSINOPHIL # BLD AUTO: 0.2 THOUSAND/ΜL (ref 0–0.61)
EOSINOPHIL NFR BLD AUTO: 3 % (ref 0–6)
ERYTHROCYTE [DISTWIDTH] IN BLOOD BY AUTOMATED COUNT: 17.4 % (ref 11.6–15.1)
FERRITIN SERPL-MCNC: 53 NG/ML (ref 11–307)
HCT VFR BLD AUTO: 39.7 % (ref 34.8–46.1)
HGB BLD-MCNC: 12.6 G/DL (ref 11.5–15.4)
IMM GRANULOCYTES # BLD AUTO: 0.02 THOUSAND/UL (ref 0–0.2)
IMM GRANULOCYTES NFR BLD AUTO: 0 % (ref 0–2)
IRON SATN MFR SERPL: 11 % (ref 15–50)
IRON SERPL-MCNC: 34 UG/DL (ref 50–212)
LYMPHOCYTES # BLD AUTO: 1.74 THOUSANDS/ΜL (ref 0.6–4.47)
LYMPHOCYTES NFR BLD AUTO: 27 % (ref 14–44)
MCH RBC QN AUTO: 26 PG (ref 26.8–34.3)
MCHC RBC AUTO-ENTMCNC: 31.7 G/DL (ref 31.4–37.4)
MCV RBC AUTO: 82 FL (ref 82–98)
MONOCYTES # BLD AUTO: 0.65 THOUSAND/ΜL (ref 0.17–1.22)
MONOCYTES NFR BLD AUTO: 10 % (ref 4–12)
NEUTROPHILS # BLD AUTO: 3.83 THOUSANDS/ΜL (ref 1.85–7.62)
NEUTS SEG NFR BLD AUTO: 59 % (ref 43–75)
NRBC BLD AUTO-RTO: 0 /100 WBCS
PLATELET # BLD AUTO: 274 THOUSANDS/UL (ref 149–390)
PMV BLD AUTO: 11.2 FL (ref 8.9–12.7)
RBC # BLD AUTO: 4.85 MILLION/UL (ref 3.81–5.12)
TIBC SERPL-MCNC: 304 UG/DL (ref 250–450)
UIBC SERPL-MCNC: 270 UG/DL (ref 155–355)
WBC # BLD AUTO: 6.5 THOUSAND/UL (ref 4.31–10.16)

## 2024-10-03 PROCEDURE — 82728 ASSAY OF FERRITIN: CPT

## 2024-10-03 PROCEDURE — 83540 ASSAY OF IRON: CPT

## 2024-10-03 PROCEDURE — 85025 COMPLETE CBC W/AUTO DIFF WBC: CPT

## 2024-10-03 PROCEDURE — 36415 COLL VENOUS BLD VENIPUNCTURE: CPT

## 2024-10-03 PROCEDURE — 83550 IRON BINDING TEST: CPT

## 2024-10-08 ENCOUNTER — OFFICE VISIT (OUTPATIENT)
Dept: HEMATOLOGY ONCOLOGY | Facility: CLINIC | Age: 49
End: 2024-10-08
Payer: COMMERCIAL

## 2024-10-08 ENCOUNTER — TELEPHONE (OUTPATIENT)
Dept: HEMATOLOGY ONCOLOGY | Facility: CLINIC | Age: 49
End: 2024-10-08

## 2024-10-08 VITALS
HEART RATE: 73 BPM | HEIGHT: 62 IN | SYSTOLIC BLOOD PRESSURE: 104 MMHG | TEMPERATURE: 97.6 F | RESPIRATION RATE: 16 BRPM | WEIGHT: 211 LBS | OXYGEN SATURATION: 99 % | BODY MASS INDEX: 38.83 KG/M2 | DIASTOLIC BLOOD PRESSURE: 60 MMHG

## 2024-10-08 DIAGNOSIS — Z86.711 HISTORY OF PULMONARY EMBOLISM: ICD-10-CM

## 2024-10-08 DIAGNOSIS — E61.1 IRON DEFICIENCY: Primary | ICD-10-CM

## 2024-10-08 PROCEDURE — 99214 OFFICE O/P EST MOD 30 MIN: CPT | Performed by: PHYSICIAN ASSISTANT

## 2024-10-08 RX ORDER — RIVAROXABAN 20 MG/1
20 TABLET, FILM COATED ORAL
Qty: 90 TABLET | Refills: 3 | Status: SHIPPED | OUTPATIENT
Start: 2024-10-08

## 2024-10-08 RX ORDER — SODIUM CHLORIDE 9 MG/ML
20 INJECTION, SOLUTION INTRAVENOUS ONCE
OUTPATIENT
Start: 2024-10-22

## 2024-10-08 NOTE — PROGRESS NOTES
[facility]ATOLOG[facility] [address]  [facility] [place] [address]  Hematology Ambulatory Follow-Up  Yoselyn Benjamin, 1975, [id number]  10/8/2024      Assessment and Plan     49-year-old female with a history of recurrent PE, frequent miscarriage, VIOLETTA who presents as follow up.       1. History of pulmonary embolism  She had hypercoagulable work-up 11/2022 & 1/2023 which showed negative testing specifically for antiphospholipid syndrome.  She is taking Xarelto 20 mg once daily without any issues.  Due to her recurrent PE history, occupation with working from home, she will continue lifelong AC with Xarelto. She denies bleeding other than vaginal bleeding.      2. Iron deficiency anemia due to chronic blood loss  3. Menorrhagia with regular cycle  Hx of fibroids. Still has menorrhagia. She has received IV venofer last in August. Most recent labs show hgb 12.6, iron sat 11%. ferritin 53 She complains of memory and concentration difficulty, fatigue. Has failed oral iron in the past.     Discussion/Plan   Continue Xarelto 20 mg daily.  Continue to recommend lifelong anticoagulation as long as it is financially affordable and she is a low bleeding risk.  Rx sent to pharmacy.  Iron panel mixed, ferritin stores remain relatively low.  Will give additional IV Venofer 300 mg weekly x 2 as she continues to have menorrhagia  Repeat CBC,BMP, iron panel in 6 months      Patient voiced agreement and understanding to the above.   Patient advised to call the Hematology/Oncology office with any questions and concerns regarding the above.    Barrier(s) to care: None  The patient is able to self care.    Haleigh Rodriguez PA-C   Medical Oncology/Hematology  [facility]    Subjective     Chief Complaint   Patient presents with    Follow-up       History of present illness: 48-year-old female with past medical history of PE, VIOLETTA who presents as follow      Patient previously followed by Linda Covarrubias PA-C.  History as below: Up.     1. Pulmonary Embolism, bilateral  2. History of multiple miscarriages   - 2017: Patient had bilateral PE with possible heart strain. Diagnosed in [place]. Was placed on Xarelto 20mg once daily for 7 months. Then taken off due to menstrual cycle increased. Patient had bilateral PE again in 2021. Diagnosed in [place]. Placed on Xarelto again.  - Patient had history of 4 miscarriages. All in first trimester. Last pregnancy was in 2020/2021.  - Possible provoking factor for 2017 is long travel in car sometimes for entire days on weekend with . Patient currently working from home sitting for long periods of time. Currently on lifelong AC due to recurrent PE + occupational history.      - 11/2022: thrombosis panel including factor V Leiden mutation, lupus anticoagulant, protein S total antigen, protein S functional testing, protein C functional testing, factor II, beta-2 glycoprotein antibodies, Antithrombin III activity, anticardiolipin antibody all of which were unrevealing. Only testing not done was prothrombin gene mutation.     3. VIOLETTA likely secondary to Menorrhagia  - patient recently diagnosed with fibroids  - 1/4/23: WBC 11.27, Hgb 10.7, MCV 75, PLT 3131K, ANC 8.54, rest of diff acceptable. No iron panel.   - 1-2/2023: S/P Venofer 300mg x 4 since Feraheme was not approved by insurance   - 8/2023: WBC 9.0, Hgb 11.8, MCV 81, MCH 26.2, MCHC 32.5, PLT 308K, diff normal. Ferritin 11, iron 72, TIBC 351, iron sat 21%.  - 07/2024: Hemoglobin 12.3, MCV 79, platelets and WBC are normal.  Iron saturation 28%, iron 89, UIBC normal, ferritin 11.  - 07/2024: IV Venofer 300mg weekly x4   -10/2024: wbc 6.5, Hgb 12.6, PLT 274k. Iron sat 11%, iron 34, uibc normal, ferritin 53. Recent eGFR normal.    10/08/24 :  Lab Results   Component Value Date    IRON 34 (L) 10/03/2024    TIBC 304 10/03/2024    FERRITIN 53 10/03/2024     Lab Results   Component Value  "Date    WBC 6.50 10/03/2024    HGB 12.6 10/03/2024    HCT 39.7 10/03/2024    MCV 82 10/03/2024     10/03/2024       Interval history: Complains of fatigue, brittle hair, hair loss.  No other concerns at this time.    Review of Systems   All other systems reviewed and are negative.      Patient Active Problem List   Diagnosis    History of pulmonary embolism    Encounter for preconception consultation    Family history of sickle cell trait    Mild intermittent asthma    BMI 40.0-44.9, adult (HCC)    Infertility, female    History of 2  sections    History of  death    Iron deficiency anemia due to chronic blood loss    Anxiety    ARJUN (obstructive sleep apnea)     Past Medical History:   Diagnosis Date    Anemia     Asthma     history, no symptoms in years, inhaler (\"starts with an A\") is     Pulmonary embolism (HCC)     2017, bilateral, put on Xarelto, took with NSAIDs and had adnexal hemorrhage complication; recurred in 2019 now is on lifelong Xarelto, managed by pulmonologist     Past Surgical History:   Procedure Laterality Date    APPENDECTOMY       SECTION      x2 (, and in , with intervening TOLAC)    COLONOSCOPY      benign polyp removed    KNEE SURGERY Right     SHOULDER SURGERY Right      Family History   Problem Relation Age of Onset    Asthma Brother     Asthma Son      Social History     Socioeconomic History    Marital status: /Civil Union     Spouse name: None    Number of children: None    Years of education: None    Highest education level: None   Occupational History    None   Tobacco Use    Smoking status: Never    Smokeless tobacco: Never   Vaping Use    Vaping status: Never Used   Substance and Sexual Activity    Alcohol use: Yes     Alcohol/week: 5.0 standard drinks of alcohol     Types: 5 Glasses of wine per week    Drug use: Never    Sexual activity: Yes     Partners: Male     Birth control/protection: Abstinence   Other Topics Concern    " "None   Social History Narrative    None     Social Determinants of Health     Financial Resource Strain: Not on file   Food Insecurity: Not on file   Transportation Needs: Not on file   Physical Activity: Not on file   Stress: Not on file   Social Connections: Not on file   Intimate Partner Violence: Not on file   Housing Stability: Not on file       Current Outpatient Medications:     albuterol (ProAir HFA) 90 mcg/act inhaler, Inhale 2 puffs every 6 (six) hours as needed for wheezing, Disp: 25.5 g, Rfl: 3    atorvastatin (LIPITOR) 20 mg tablet, Take 20 mg by mouth daily, Disp: , Rfl:     multivitamin (THERAGRAN) TABS, Take 1 tablet by mouth daily, Disp: , Rfl:     Omega-3-acid Ethyl Esters & D3 1 & 1000 GM & UNIT KIT, Take 2 g by mouth daily, Disp: , Rfl:     tirzepatide (Zepbound) 10 mg/0.5 mL auto-injector, Inject 0.5 mL (10 mg total) under the skin once a week, Disp: 2 mL, Rfl: 1    tirzepatide (Zepbound) 7.5 mg/0.5 mL auto-injector, Inject 0.5 mL (7.5 mg total) under the skin once a week, Disp: 6 mL, Rfl: 0    Xarelto 20 MG tablet, Take 1 tablet (20 mg total) by mouth daily with dinner, Disp: 90 tablet, Rfl: 1    atorvastatin (LIPITOR) 10 mg tablet, Take 20 mg by mouth daily Per patient 20mg (Patient not taking: Reported on 10/8/2024), Disp: , Rfl:     cloNIDine (CATAPRES) 0.1 mg tablet, TAKE 1 TABLET (0.1 MG) BY ORAL ROUTE 3 TIMES PER DAY AS NEEDED FOR ANXIETY AND IRRITABILITY (Patient not taking: Reported on 2/22/2024), Disp: , Rfl:     ondansetron (ZOFRAN-ODT) 4 mg disintegrating tablet, Take 1 tablet (4 mg total) by mouth every 6 (six) hours as needed for nausea or vomiting (Patient not taking: Reported on 8/29/2023), Disp: 20 tablet, Rfl: 0  Allergies   Allergen Reactions    Pollen Extract Sneezing       Objective   /60 (BP Location: Left arm, Patient Position: Sitting, Cuff Size: Standard)   Pulse 73   Temp 97.6 °F (36.4 °C) (Temporal)   Resp 16   Ht 5' 2\" (1.575 m)   Wt 95.7 kg (211 lb)   " SpO2 99%   BMI 38.59 kg/m²    Physical Exam  Vitals reviewed.   HENT:      Head: Normocephalic.   Cardiovascular:      Rate and Rhythm: Normal rate.   Pulmonary:      Effort: Pulmonary effort is normal.   Abdominal:      Palpations: Abdomen is soft.   Musculoskeletal:      Cervical back: Neck supple.   Skin:     Findings: No rash.   Neurological:      Mental Status: She is alert.         Result Review  Labs:  Appointment on 10/03/2024   Component Date Value Ref Range Status    WBC 10/03/2024 6.50  4.31 - 10.16 Thousand/uL Final    RBC 10/03/2024 4.85  3.81 - 5.12 Million/uL Final    Hemoglobin 10/03/2024 12.6  11.5 - 15.4 g/dL Final    Hematocrit 10/03/2024 39.7  34.8 - 46.1 % Final    MCV 10/03/2024 82  82 - 98 fL Final    MCH 10/03/2024 26.0 (L)  26.8 - 34.3 pg Final    MCHC 10/03/2024 31.7  31.4 - 37.4 g/dL Final    RDW 10/03/2024 17.4 (H)  11.6 - 15.1 % Final    MPV 10/03/2024 11.2  8.9 - 12.7 fL Final    Platelets 10/03/2024 274  149 - 390 Thousands/uL Final    nRBC 10/03/2024 0  /100 WBCs Final    Segmented % 10/03/2024 59  43 - 75 % Final    Immature Grans % 10/03/2024 0  0 - 2 % Final    Lymphocytes % 10/03/2024 27  14 - 44 % Final    Monocytes % 10/03/2024 10  4 - 12 % Final    Eosinophils Relative 10/03/2024 3  0 - 6 % Final    Basophils Relative 10/03/2024 1  0 - 1 % Final    Absolute Neutrophils 10/03/2024 3.83  1.85 - 7.62 Thousands/µL Final    Absolute Immature Grans 10/03/2024 0.02  0.00 - 0.20 Thousand/uL Final    Absolute Lymphocytes 10/03/2024 1.74  0.60 - 4.47 Thousands/µL Final    Absolute Monocytes 10/03/2024 0.65  0.17 - 1.22 Thousand/µL Final    Eosinophils Absolute 10/03/2024 0.20  0.00 - 0.61 Thousand/µL Final    Basophils Absolute 10/03/2024 0.06  0.00 - 0.10 Thousands/µL Final    Iron Saturation 10/03/2024 11 (L)  15 - 50 % Final    TIBC 10/03/2024 304  250 - 450 ug/dL Final    Iron 10/03/2024 34 (L)  50 - 212 ug/dL Final    Patients treated with metal-binding drugs (ie. Deferoxamine) may  have depressed iron values.    UIBC 10/03/2024 270  155 - 355 ug/dL Final    Ferritin 10/03/2024 53  11 - 307 ng/mL Final   Admission on 09/13/2024, Discharged on 09/13/2024   Component Date Value Ref Range Status    Ventricular Rate 09/13/2024 73  BPM Final    Atrial Rate 09/13/2024 73  BPM Final    HI Interval 09/13/2024 146  ms Final    QRSD Interval 09/13/2024 88  ms Final    QT Interval 09/13/2024 402  ms Final    QTC Interval 09/13/2024 442  ms Final    P Axis 09/13/2024 67  degrees Final    QRS Axis 09/13/2024 30  degrees Final    T Wave Axis 09/13/2024 47  degrees Final    WBC 09/13/2024 7.98  4.31 - 10.16 Thousand/uL Final    RBC 09/13/2024 4.49  3.81 - 5.12 Million/uL Final    Hemoglobin 09/13/2024 11.7  11.5 - 15.4 g/dL Final    Hematocrit 09/13/2024 36.4  34.8 - 46.1 % Final    MCV 09/13/2024 81 (L)  82 - 98 fL Final    MCH 09/13/2024 26.1 (L)  26.8 - 34.3 pg Final    MCHC 09/13/2024 32.1  31.4 - 37.4 g/dL Final    RDW 09/13/2024 17.8 (H)  11.6 - 15.1 % Final    MPV 09/13/2024 11.0  8.9 - 12.7 fL Final    Platelets 09/13/2024 312  149 - 390 Thousands/uL Final    nRBC 09/13/2024 0  /100 WBCs Final    Segmented % 09/13/2024 51  43 - 75 % Final    Immature Grans % 09/13/2024 0  0 - 2 % Final    Lymphocytes % 09/13/2024 35  14 - 44 % Final    Monocytes % 09/13/2024 10  4 - 12 % Final    Eosinophils Relative 09/13/2024 3  0 - 6 % Final    Basophils Relative 09/13/2024 1  0 - 1 % Final    Absolute Neutrophils 09/13/2024 4.08  1.85 - 7.62 Thousands/µL Final    Absolute Immature Grans 09/13/2024 0.02  0.00 - 0.20 Thousand/uL Final    Absolute Lymphocytes 09/13/2024 2.79  0.60 - 4.47 Thousands/µL Final    Absolute Monocytes 09/13/2024 0.76  0.17 - 1.22 Thousand/µL Final    Eosinophils Absolute 09/13/2024 0.27  0.00 - 0.61 Thousand/µL Final    Basophils Absolute 09/13/2024 0.06  0.00 - 0.10 Thousands/µL Final    Protime 09/13/2024 26.1 (H)  12.3 - 15.0 seconds Final    INR 09/13/2024 2.32 (H)  0.85 - 1.19 Final     PTT 09/13/2024 35 (H)  23 - 34 seconds Final    Therapeutic Heparin Range =  60-90 seconds    Sodium 09/13/2024 137  135 - 147 mmol/L Final    Potassium 09/13/2024 3.9  3.5 - 5.3 mmol/L Final    Chloride 09/13/2024 104  96 - 108 mmol/L Final    CO2 09/13/2024 27  21 - 32 mmol/L Final    ANION GAP 09/13/2024 6  4 - 13 mmol/L Final    BUN 09/13/2024 11  5 - 25 mg/dL Final    Creatinine 09/13/2024 0.99  0.60 - 1.30 mg/dL Final    Standardized to IDMS reference method    Glucose 09/13/2024 86  65 - 140 mg/dL Final    If the patient is fasting, the ADA then defines impaired fasting glucose as > 100 mg/dL and diabetes as > or equal to 123 mg/dL.    Calcium 09/13/2024 9.3  8.4 - 10.2 mg/dL Final    eGFR 09/13/2024 68  ml/min/1.73sq m Final    Total Bilirubin 09/13/2024 0.41  0.20 - 1.00 mg/dL Final    Use of this assay is not recommended for patients undergoing treatment with eltrombopag due to the potential for falsely elevated results.  N-acetyl-p-benzoquinone imine (metabolite of Acetaminophen) will generate erroneously low results in samples for patients that have taken an overdose of Acetaminophen.    Bilirubin, Direct 09/13/2024 0.10  0.00 - 0.20 mg/dL Final    Alkaline Phosphatase 09/13/2024 45  34 - 104 U/L Final    AST 09/13/2024 15  13 - 39 U/L Final    ALT 09/13/2024 16  7 - 52 U/L Final    Specimen collection should occur prior to Sulfasalazine administration due to the potential for falsely depressed results.     Total Protein 09/13/2024 7.2  6.4 - 8.4 g/dL Final    Albumin 09/13/2024 4.2  3.5 - 5.0 g/dL Final    TSH 3RD GENERATON 09/13/2024 2.930  0.450 - 4.500 uIU/mL Final    The recommended reference ranges for TSH during pregnancy are as follows:   First trimester 0.100 to 2.500 uIU/mL   Second trimester  0.200 to 3.000 uIU/mL   Third trimester 0.300 to 3.000 uIU/m    Note: Normal ranges may not apply to patients who are transgender, non-binary, or whose legal sex, sex at birth, and gender identity  "differ.  Adult TSH (3rd generation) reference range follows the recommended guidelines of the American Thy[employer] Association, January, 2020.    Magnesium 09/13/2024 2.1  1.9 - 2.7 mg/dL Final    hs TnI 0hr 09/13/2024 <2  \"Refer to ACS Flowchart\"- see link ng/L Final    Comment:                                              Initial (time 0) result  If >=50 ng/L, Myocardial injury suggested ;  Type of myocardial injury and treatment strategy  to be determined.  If 5-49 ng/L, a delta result at 2 hours and or 4 hours will be needed to further evaluate.  If <4 ng/L, and chest pain has been >3 hours since onset, patient may qualify for discharge based on the HEART score in the ED.  If <5 ng/L and <3hours since onset of chest pain, a delta result at 2 hours will be needed to further evaluate.    HS Troponin 99th Percentile URL of a Health Population=12 ng/L with a 95% Confidence Interval of 8-18 ng/L.    Second Troponin (time 2 hours)  If calculated delta >= 20 ng/L,  Myocardial injury suggested ; Type of myocardial injury and treatment strategy to be determined.  If 5-49 ng/L and the calculated delta is 5-19 ng/L, consult medical service for evaluation.  Continue evaluation for ischemia on ecg and other possible etiology and repeat hs troponin at 4 hours.  If delta                            is <5 ng/L at 2 hours, consider discharge based on risk stratification via the HEART score (if in ED), or LOUISA risk score in IP/Observation.    HS Troponin 99th Percentile URL of a Health Population=12 ng/L with a 95% Confidence Interval of 8-18 ng/L.    D-Dimer, Quant 09/13/2024 <0.27  <0.50 ug/ml FEU Final    Reference and upper limits to exclude DVT and PE are the same.  Do not use to exclude if clinical symptoms are present.  Pregnant women:  1st trimester:  <0.22 - 1.06 ug/ml FEU  2nd trimester:  <0.22 - 1.88 ug/ml FEU  3rd trimester:   0.24 - 3.28 ug/ml FEU    Note: Normal ranges may not apply to patients who are transgender, " non-binary, or whose legal sex, sex at birth, and gender identity differ.      Preg, Serum 09/13/2024 Negative  Negative Final       Imaging:   I reviewed relevant imaging    Please note:  This report has been generated by a voice recognition software system. Therefore there may be syntax, spelling, and/or grammatical errors. Please call if you have any questions.

## 2024-10-24 ENCOUNTER — TELEPHONE (OUTPATIENT)
Dept: INFUSION CENTER | Facility: CLINIC | Age: 49
End: 2024-10-24

## 2024-10-24 NOTE — TELEPHONE ENCOUNTER
Patient has no showed 2 times within a 6 month period.  Patient no showed on 7/26/24 and 10/23/24.  Infusion techs please contact patient and review the no show policy as well as her next scheduled appt.

## 2024-10-25 DIAGNOSIS — E61.1 IRON DEFICIENCY: Primary | ICD-10-CM

## 2024-10-25 RX ORDER — SODIUM CHLORIDE 9 MG/ML
20 INJECTION, SOLUTION INTRAVENOUS ONCE
OUTPATIENT
Start: 2024-10-29

## 2024-10-29 ENCOUNTER — HOSPITAL ENCOUNTER (OUTPATIENT)
Dept: INFUSION CENTER | Facility: CLINIC | Age: 49
Discharge: HOME | End: 2024-10-29
Payer: COMMERCIAL

## 2024-10-29 VITALS
HEART RATE: 73 BPM | SYSTOLIC BLOOD PRESSURE: 110 MMHG | TEMPERATURE: 98.2 F | RESPIRATION RATE: 18 BRPM | DIASTOLIC BLOOD PRESSURE: 55 MMHG

## 2024-10-29 DIAGNOSIS — E61.1 IRON DEFICIENCY: Primary | ICD-10-CM

## 2024-10-29 RX ORDER — SODIUM CHLORIDE 9 MG/ML
20 INJECTION, SOLUTION INTRAVENOUS ONCE
Status: CANCELLED | OUTPATIENT
Start: 2024-11-08

## 2024-10-29 RX ORDER — SODIUM CHLORIDE 9 MG/ML
20 INJECTION, SOLUTION INTRAVENOUS ONCE
Status: COMPLETED | OUTPATIENT
Start: 2024-10-29 | End: 2024-10-29

## 2024-10-29 RX ADMIN — SODIUM CHLORIDE 20 ML/HR: 9 INJECTION, SOLUTION INTRAVENOUS at 15:16

## 2024-10-29 RX ADMIN — IRON SUCROSE 300 MG: 20 INJECTION, SOLUTION INTRAVENOUS at 15:18

## 2024-10-29 NOTE — PROGRESS NOTES
Patient arrives to infusion center for IV Venofer infusion. Patient offers no complaints today. PIV established, patient tolerated entire infusion without complication. PIV removed, AVS offered.     Next appointment: 11/8/24 @ [id number]

## 2024-11-06 ENCOUNTER — OFFICE VISIT (OUTPATIENT)
Dept: BARIATRICS | Facility: CLINIC | Age: 49
End: 2024-11-06
Payer: COMMERCIAL

## 2024-11-06 VITALS
HEART RATE: 76 BPM | SYSTOLIC BLOOD PRESSURE: 114 MMHG | DIASTOLIC BLOOD PRESSURE: 76 MMHG | HEIGHT: 62 IN | BODY MASS INDEX: 37.32 KG/M2 | WEIGHT: 202.8 LBS

## 2024-11-06 DIAGNOSIS — E66.812 CLASS 2 SEVERE OBESITY DUE TO EXCESS CALORIES WITH SERIOUS COMORBIDITY AND BODY MASS INDEX (BMI) OF 37.0 TO 37.9 IN ADULT (HCC): ICD-10-CM

## 2024-11-06 DIAGNOSIS — E66.812 OBESITY, CLASS II, BMI 35-39.9: Primary | ICD-10-CM

## 2024-11-06 DIAGNOSIS — E66.01 CLASS 2 SEVERE OBESITY DUE TO EXCESS CALORIES WITH SERIOUS COMORBIDITY AND BODY MASS INDEX (BMI) OF 37.0 TO 37.9 IN ADULT (HCC): ICD-10-CM

## 2024-11-06 PROCEDURE — 99215 OFFICE O/P EST HI 40 MIN: CPT | Performed by: INTERNAL MEDICINE

## 2024-11-06 RX ORDER — TIRZEPATIDE 10 MG/.5ML
INJECTION, SOLUTION SUBCUTANEOUS
COMMUNITY
Start: 2024-10-22

## 2024-11-06 RX ORDER — METFORMIN HYDROCHLORIDE 500 MG/1
500 TABLET, EXTENDED RELEASE ORAL 2 TIMES DAILY WITH MEALS
Qty: 180 TABLET | Refills: 3 | Status: SHIPPED | OUTPATIENT
Start: 2024-11-06

## 2024-11-06 NOTE — PROGRESS NOTES
Program: [facility]    Assessment/Plan     Yoselyn Benjamin  is a 49 y.o. female with  returns to follow up  for treatment of excess body weight and associated risk factors/co-morbidities.     Class 2 severe obesity due to excess calories with serious comorbidity and body mass index (BMI) of 37.0 to 37.9 in adult (HCC)  In commended patient on healthy food choices and staying protein focused  -I discussed with patient that it is possible that she may not be in a calorie deficit based on her diet recall  -Did encourage her to account for liquid calories and alcohol juice and her fruit smoothies.  -Encouraged her to meet with dietitian in [facility] for nutrition prescription to ensure that she is truly staying in a calorie deficit; patient will continue to track using lose it  -Continue excellent workout regimen 3 to 5 days a week treadmill and weights at the gym  -Discussed increase hydration as patient complains of constipation while on Zepbound  -Please ensure adequate hydration 64+ ounces and start an over-the-counter stool softener Colace and Senokot.  Also consider introducing fiber supplement such as Benefiber or Metamucil.  Also to keep some MiraLAX handy if the above measures are not sufficient to address the constipation.     -She has no contraindications to other oral medications such as phentermine topiramate naltrexone bupropion and metformin  -Based on her weight loss trajectory she is losing 1 pound a week and is on track  -She was on 7.5 mg of Zepbound for 3 months and was recently increased to the 10 mg but not able to find the medication  --Metformin would be first option for combination therapy with Zepbound if required due to synergistic mechanism of action in the interim to hold her over.  I also did discuss with patient to call multiple pharmacies to find Zepbound.  She could continue staying on the 7.5 mg dose if she is unable to find the Zepbound.  She has prescriptions for  "both.  She is using the [employer] coupon for $550  -Patient would like to continue following up with the [facility].  I did discuss with patient that every other visit could be virtual if needed.       Most recent notes and records were reviewed.         Return visit:  2-3 months     Nutrition   Do not skip meals. Avoid sugary beverages. At least 64oz of water daily.    Behavioral/Stress   Food log via guille or provided paper log (guille options include www.WeLab.com, sparkpeople.com, loseit.com, calorieking.com, Bahu). Encouraged mindful eating    Physical Activity  Increase physical activity by 10 minutes daily. Gradually increase physical activity to a goal of 5 days per week for 30 minutes of MODERATE intensity ( ( should be able to pass the \"talk test\" but should not be able to sing.  target 150-300 minutes  PLUS 2-3 days per week of FULL BODY resistance training. Progression will be addressed at follow up visits. Encouraged planning regarding establishing physical activity routine    Sleep  Provided sleep hygiene counseling and importance of having adequate sleep duration          Yoselyn was seen today for follow-up.    Diagnoses and all orders for this visit:    Obesity, Class II, BMI 35-39.9  -     metFORMIN (GLUCOPHAGE-XR) 500 mg 24 hr tablet; Take 1 tablet (500 mg total) by mouth 2 (two) times a day with meals Start with 1 tablet with the dinner meal and in 1 week increase to 1 tablet twice a day    Class 2 severe obesity due to excess calories with serious comorbidity and body mass index (BMI) of 37.0 to 37.9 in adult (HCC)                Total time spent reviewing chart, interviewing patient, examining patient, discussing plan, answering all questions, and documentin minutes with >50% face-to-face time with the patient.            Weight trajectory     Wt Readings from Last 20 Encounters:   24 92 kg (202 lb 12.8 oz)   10/08/24 95.7 kg (211 lb)   24 98.1 kg (216 lb 4.3 oz) "   08/08/24 99.9 kg (220 lb 3.2 oz)   07/29/24 101 kg (221 lb 9.6 oz)   07/05/24 103 kg (228 lb)   04/25/24 108 kg (238 lb 9.6 oz)   02/22/24 110 kg (243 lb 6.4 oz)   01/09/24 110 kg (243 lb 9.6 oz)   08/29/23 111 kg (245 lb)   01/11/23 110 kg (243 lb)   01/04/23 106 kg (234 lb)   11/07/22 109 kg (240 lb)   05/27/21 102 kg (225 lb 15.5 oz)       Patient has been under the care of Dr. De Paz since January 2024.  She was started on Zepbound since February 2024.  She was escalated from 7.5 mg to 10 mg but reports that she was unable to find it at her pharmacy.    2 years ago she was trying to undergo the sleeve gastrectomy through [facility].  However, her insurance 2 weeks prior to this surgery denied the procedure.      Lifestyle questionnaire     Tracking on loseit     Diet recall:  B: skips with green tea or premier  S: smoothie blueberries sneha banana aman and protein powder  L: Grilled chicken salad or egg salad or sandwiches only meat and cheese  S: edamame  D: PS rice, veggies chicken breast , steak   S: none    Frequency Eating out x/ week: 1x/ week    Food behaviors-cravings before menses    Trouble area of the day: none    Beverages  Water--32-48  oz   Caffeine/tea--  12 oz   SSB -- tropicana     Alcohol: 2 drinks/ week   Smoking: no  Drug use: no    Physical Activity --gym 45 mins treadmill and weights 3-5 dasy a week, walking     Sleep -- STOP- BANG- 4/8 ;6 hours of sleep per night    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, 27    Psycho social- lives with [employer] [facility]    Gyneac (Menopausal status/menses/contraception)-      Start date: 1/9/2024  Intial weight : 243 lbs  Intial BMI: 42.47 kg/m2  Obesity Class: Above 40- Obesity Class III  Goal weight: 150 lbs      Colonoscopy: UTD  Mammogram: UTD      Weight on 11/6/2024: 211 lbs(-10)  BMI on 11/6/2024: 38.59 kg/m2 35.0-39.9- Obesity Class II  Weight last visit on 7/9/2024: 221 lbs(-22)  Difference: -32 lbs      Anti  "obesity Medications/ Medical---    Weight loss medication and dose: Zepbound   Date initiated: Feb 24          Objective         The following portions of the patient's history were reviewed and updated as appropriate: allergies, current medications, past family history, past medical history, past social history, past surgical history, and problem list.      /76 (BP Location: Left arm, Patient Position: Sitting, Cuff Size: Large)   Pulse 76   Ht 5' 2\" (1.575 m)   Wt 92 kg (202 lb 12.8 oz)   LMP 10/06/2024 (Approximate)   BMI 37.09 kg/m²             Review of Systems   Constitutional:  Negative for fatigue.   HENT:  Negative for sore throat.    Respiratory:  Negative for cough and shortness of breath.    Cardiovascular:  Negative for chest pain, palpitations and leg swelling.   Gastrointestinal:  Negative for abdominal pain, constipation, diarrhea and nausea.   Genitourinary:  Negative for dysuria.   Musculoskeletal:  Negative for arthralgias and back pain.   Skin:  Negative for rash.   Neurological:  Negative for headaches.   Psychiatric/Behavioral:  Negative for dysphoric mood. The patient is not nervous/anxious.          Physical Exam  Vitals and nursing note reviewed.   Constitutional:       Appearance: Normal appearance.   HENT:      Head: Normocephalic.   Pulmonary:      Effort: Pulmonary effort is normal.   Neurological:      General: No focal deficit present.      Mental Status: She is alert and oriented to person, place, and time.   Psychiatric:         Mood and Affect: Mood normal.         Behavior: Behavior normal.         Thought Content: Thought content normal.         Judgment: Judgment normal.          Current medications       Current Outpatient Medications:     atorvastatin (LIPITOR) 20 mg tablet, Take 20 mg by mouth daily, Disp: , Rfl:     metFORMIN (GLUCOPHAGE-XR) 500 mg 24 hr tablet, Take 1 tablet (500 mg total) by mouth 2 (two) times a day with meals Start with 1 tablet with the dinner " meal and in 1 week increase to 1 tablet twice a day, Disp: 180 tablet, Rfl: 3    multivitamin (THERAGRAN) TABS, Take 1 tablet by mouth daily, Disp: , Rfl:     Omega-3-acid Ethyl Esters & D3 1 & 1000 GM & UNIT KIT, Take 2 g by mouth daily, Disp: , Rfl:     tirzepatide (Zepbound) 7.5 mg/0.5 mL auto-injector, Inject 0.5 mL (7.5 mg total) under the skin once a week, Disp: 6 mL, Rfl: 0    Xarelto 20 MG tablet, Take 1 tablet (20 mg total) by mouth daily with dinner, Disp: 90 tablet, Rfl: 3    Zepbound 10 MG/0.5ML auto-injector, , Disp: , Rfl:     albuterol (ProAir HFA) 90 mcg/act inhaler, Inhale 2 puffs every 6 (six) hours as needed for wheezing (Patient not taking: Reported on 11/6/2024), Disp: 25.5 g, Rfl: 3    atorvastatin (LIPITOR) 10 mg tablet, Take 20 mg by mouth daily Per patient 20mg (Patient not taking: Reported on 10/8/2024), Disp: , Rfl:     cloNIDine (CATAPRES) 0.1 mg tablet, TAKE 1 TABLET (0.1 MG) BY ORAL ROUTE 3 TIMES PER DAY AS NEEDED FOR ANXIETY AND IRRITABILITY (Patient not taking: Reported on 2/22/2024), Disp: , Rfl:          Medication considerations/contraindications     -Patient denies personal history of pancreatitis. Patient also denies personal and family history of medullary thyroid cancer, and multiple endocrine neoplasia type 2 (MEN 2 tumor). -Patient denies any history of kidney stones, seizures, or glaucoma, diabetic retinopathy, gall bladder disease, gastroparesis, hyperthyroidism.  -Denies Hx of CAD, PAD, palpitations, arrhythmia, uncontrolled hypertension  -Denies uncontrolled anxiety or depression, suicidal behavior or thinking , insomnia or sleep disturbance.         Labs     Most recent labs reviewed   Lab Results   Component Value Date    SODIUM 137 09/13/2024    K 3.9 09/13/2024     09/13/2024    CO2 27 09/13/2024    AGAP 6 09/13/2024    BUN 11 09/13/2024    CREATININE 0.99 09/13/2024    GLUC 86 09/13/2024    CALCIUM 9.3 09/13/2024    AST 15 09/13/2024    ALT 16 09/13/2024     "ALKPHOS 45 09/13/2024    TP 7.2 09/13/2024    TBILI 0.41 09/13/2024    EGFR 68 09/13/2024     Lab Results   Component Value Date    HGBA1C 5.3 07/12/2024     Lab Results   Component Value Date    AMZ0RWKKVTHS 2.930 09/13/2024    TSH 2.53 11/18/2022     No results found for: \"CHOLESTEROL\"  No results found for: \"HDL\"  No results found for: \"TRIG\"  No results found for: \"LDLCALC\"  No results found for: \"VITD\"  No components found for: \"FASTINS\"   "

## 2024-11-06 NOTE — ASSESSMENT & PLAN NOTE
In commended patient on healthy food choices and staying protein focused  -I discussed with patient that it is possible that she may not be in a calorie deficit based on her diet recall  -Did encourage her to account for liquid calories and alcohol juice and her fruit smoothies.  -Encouraged her to meet with dietitian in a [place] for nutrition prescription to ensure that she is truly staying in a calorie deficit; patient will continue to track using lose it  -Continue excellent workout regimen 3 to 5 days a week treadmill and weights at the gym  -Discussed increase hydration as patient complains of constipation while on Zepbound  -Please ensure adequate hydration 64+ ounces and start an over-the-counter stool softener Colace and Senokot.  Also consider introducing fiber supplement such as Benefiber or Metamucil.  Also to keep some MiraLAX handy if the above measures are not sufficient to address the constipation.     -She has no contraindications to other oral medications such as phentermine topiramate naltrexone bupropion and metformin  -Based on her weight loss trajectory she is losing 1 pound a week and is on track  -She was on 7.5 mg of Zepbound for 3 months and was recently increased to the 10 mg but not able to find the medication  --Metformin would be first option for combination therapy with Zepbound if required due to synergistic mechanism of action in the interim to hold her over.  I also did discuss with patient to call multiple pharmacies to find Zepbound.  She could continue staying on the 7.5 mg dose if she is unable to find the Zepbound.  She has prescriptions for both.  She is using the [employer] coupon for $550  -Patient would like to continue following up with the [facility].  I did discuss with patient that every other visit could be virtual if needed.

## 2024-11-07 DIAGNOSIS — E61.1 IRON DEFICIENCY: Primary | ICD-10-CM

## 2024-11-07 RX ORDER — SODIUM CHLORIDE 9 MG/ML
20 INJECTION, SOLUTION INTRAVENOUS ONCE
OUTPATIENT
Start: 2024-11-08

## 2024-11-11 ENCOUNTER — TELEPHONE (OUTPATIENT)
Dept: INFUSION CENTER | Facility: CLINIC | Age: 49
End: 2024-11-11

## 2024-11-11 NOTE — TELEPHONE ENCOUNTER
Patient has no showed 3 times within a 6 month period.  Patient no showed on 7/26/24, 10/23/24, and 11/8/24.  On 10/24/24 patient was lvm reminding her of the no show policy and her next appt.  Infusion techs please contact the patient and re-review the no show policy and her next appt.  If the patient no shows for a 4th time within a 6 month period she will be discharged from infusion per the Infusion no show policy.

## 2024-12-04 DIAGNOSIS — E66.812 CLASS 2 SEVERE OBESITY DUE TO EXCESS CALORIES WITH SERIOUS COMORBIDITY AND BODY MASS INDEX (BMI) OF 37.0 TO 37.9 IN ADULT (HCC): Primary | ICD-10-CM

## 2024-12-04 DIAGNOSIS — E66.01 CLASS 2 SEVERE OBESITY DUE TO EXCESS CALORIES WITH SERIOUS COMORBIDITY AND BODY MASS INDEX (BMI) OF 37.0 TO 37.9 IN ADULT (HCC): Primary | ICD-10-CM

## 2024-12-04 RX ORDER — TIRZEPATIDE 10 MG/.5ML
10 INJECTION, SOLUTION SUBCUTANEOUS WEEKLY
Qty: 2 ML | Refills: 2 | Status: SHIPPED | OUTPATIENT
Start: 2024-12-04 | End: 2025-02-26

## 2025-01-16 DIAGNOSIS — E66.01 CLASS 2 SEVERE OBESITY DUE TO EXCESS CALORIES WITH SERIOUS COMORBIDITY AND BODY MASS INDEX (BMI) OF 37.0 TO 37.9 IN ADULT (HCC): ICD-10-CM

## 2025-01-16 DIAGNOSIS — E66.812 CLASS 2 SEVERE OBESITY DUE TO EXCESS CALORIES WITH SERIOUS COMORBIDITY AND BODY MASS INDEX (BMI) OF 37.0 TO 37.9 IN ADULT (HCC): ICD-10-CM

## 2025-01-17 ENCOUNTER — TELEPHONE (OUTPATIENT)
Dept: BARIATRICS | Facility: CLINIC | Age: 50
End: 2025-01-17

## 2025-01-17 RX ORDER — TIRZEPATIDE 10 MG/.5ML
10 INJECTION, SOLUTION SUBCUTANEOUS WEEKLY
Qty: 2 ML | Refills: 0 | OUTPATIENT
Start: 2025-01-17 | End: 2025-04-11